# Patient Record
Sex: FEMALE | Race: WHITE | NOT HISPANIC OR LATINO | Employment: UNEMPLOYED | URBAN - METROPOLITAN AREA
[De-identification: names, ages, dates, MRNs, and addresses within clinical notes are randomized per-mention and may not be internally consistent; named-entity substitution may affect disease eponyms.]

---

## 2017-09-27 ENCOUNTER — LAB VISIT (OUTPATIENT)
Dept: LAB | Facility: HOSPITAL | Age: 62
End: 2017-09-27
Attending: DERMATOLOGY
Payer: COMMERCIAL

## 2017-09-27 ENCOUNTER — OFFICE VISIT (OUTPATIENT)
Dept: DERMATOLOGY | Facility: CLINIC | Age: 62
End: 2017-09-27
Payer: COMMERCIAL

## 2017-09-27 DIAGNOSIS — Z51.81 ENCOUNTER FOR MEDICATION MONITORING: ICD-10-CM

## 2017-09-27 DIAGNOSIS — Z85.828 PERSONAL HISTORY OF MALIGNANT NEOPLASM OF SKIN: ICD-10-CM

## 2017-09-27 DIAGNOSIS — L57.0 ACTINIC KERATOSIS: Primary | ICD-10-CM

## 2017-09-27 DIAGNOSIS — L60.3 DYSTROPHIC NAIL: ICD-10-CM

## 2017-09-27 LAB
ALBUMIN SERPL BCP-MCNC: 3.7 G/DL
ALP SERPL-CCNC: 101 U/L
ALT SERPL W/O P-5'-P-CCNC: 24 U/L
AST SERPL-CCNC: 22 U/L
BASOPHILS # BLD AUTO: 0.07 K/UL
BASOPHILS NFR BLD: 1 %
BILIRUB DIRECT SERPL-MCNC: 0.1 MG/DL
BILIRUB SERPL-MCNC: 0.4 MG/DL
DIFFERENTIAL METHOD: ABNORMAL
EOSINOPHIL # BLD AUTO: 0.3 K/UL
EOSINOPHIL NFR BLD: 4.1 %
ERYTHROCYTE [DISTWIDTH] IN BLOOD BY AUTOMATED COUNT: 13.1 %
HCT VFR BLD AUTO: 42.5 %
HGB BLD-MCNC: 14.2 G/DL
LYMPHOCYTES # BLD AUTO: 2.7 K/UL
LYMPHOCYTES NFR BLD: 38.4 %
MCH RBC QN AUTO: 30.6 PG
MCHC RBC AUTO-ENTMCNC: 33.4 G/DL
MCV RBC AUTO: 92 FL
MONOCYTES # BLD AUTO: 0.4 K/UL
MONOCYTES NFR BLD: 5.9 %
NEUTROPHILS # BLD AUTO: 3.5 K/UL
NEUTROPHILS NFR BLD: 50.3 %
PLATELET # BLD AUTO: 474 K/UL
PMV BLD AUTO: 9.6 FL
PROT SERPL-MCNC: 7.4 G/DL
RBC # BLD AUTO: 4.64 M/UL
WBC # BLD AUTO: 6.9 K/UL

## 2017-09-27 PROCEDURE — 87101 SKIN FUNGI CULTURE: CPT

## 2017-09-27 PROCEDURE — 99999 PR PBB SHADOW E&M-NEW PATIENT-LVL II: CPT | Mod: PBBFAC,,, | Performed by: DERMATOLOGY

## 2017-09-27 PROCEDURE — 85025 COMPLETE CBC W/AUTO DIFF WBC: CPT

## 2017-09-27 PROCEDURE — 99202 OFFICE O/P NEW SF 15 MIN: CPT | Mod: 25,S$GLB,, | Performed by: DERMATOLOGY

## 2017-09-27 PROCEDURE — 80076 HEPATIC FUNCTION PANEL: CPT

## 2017-09-27 PROCEDURE — 36415 COLL VENOUS BLD VENIPUNCTURE: CPT

## 2017-09-27 RX ORDER — FLUOXETINE HYDROCHLORIDE 20 MG/1
CAPSULE ORAL
Refills: 5 | COMMUNITY
Start: 2017-09-20 | End: 2018-02-21 | Stop reason: CLARIF

## 2017-09-27 NOTE — PROGRESS NOTES
Subjective:       Patient ID:  Dora Correa is a 62 y.o. female who presents for   Chief Complaint   Patient presents with    Spot     R cheek x 2 wks asymp no tx     Spot  - Initial  Affected locations: right cheek  Duration: 2 weeks  Signs / symptoms: asymptomatic  Relieving factors/Treatments tried: nothing    She is also concerned about some disfigured toenails.  She is very embarrassed by them.  No prior treatments  Derm Hx: SCC on L cheek in 1999    Past Medical History:   Diagnosis Date    Squamous cell carcinoma 1999    L cheek     Review of Systems   Skin: Positive for activity-related sunscreen use. Negative for daily sunscreen use and recent sunburn.   Hematologic/Lymphatic: Does not bruise/bleed easily.        Objective:    Physical Exam   Constitutional: She appears well-developed and well-nourished.   Neurological: She is alert and oriented to person, place, and time.   Psychiatric: She has a normal mood and affect.   Skin:   Areas Examined (abnormalities noted in diagram):   Scalp / Hair Palpated and Inspected  Head / Face Inspection Performed  Neck Inspection Performed  Chest / Axilla Inspection Performed  RLE Inspected  LLE Inspection Performed  Nails and Digits Inspection Performed                  Diagram Legend     Erythematous scaling macule/papule c/w actinic keratosis       Vascular papule c/w angioma      Pigmented verrucoid papule/plaque c/w seborrheic keratosis      Yellow umbilicated papule c/w sebaceous hyperplasia      Irregularly shaped tan macule c/w lentigo     1-2 mm smooth white papules consistent with Milia      Movable subcutaneous cyst with punctum c/w epidermal inclusion cyst      Subcutaneous movable cyst c/w pilar cyst      Firm pink to brown papule c/w dermatofibroma      Pedunculated fleshy papule(s) c/w skin tag(s)      Evenly pigmented macule c/w junctional nevus     Mildly variegated pigmented, slightly irregular-bordered macule c/w mildly atypical nevus       Flesh colored to evenly pigmented papule c/w intradermal nevus       Pink pearly papule/plaque c/w basal cell carcinoma      Erythematous hyperkeratotic cursted plaque c/w SCC      Surgical scar with no sign of skin cancer recurrence      Open and closed comedones      Inflammatory papules and pustules      Verrucoid papule consistent consistent with wart     Erythematous eczematous patches and plaques     Dystrophic onycholytic nail with subungual debris c/w onychomycosis     Umbilicated papule    Erythematous-base heme-crusted tan verrucoid plaque consistent with inflamed seborrheic keratosis     Erythematous Silvery Scaling Plaque c/w Psoriasis     See annotation      Assessment / Plan:        Actinic keratosis  Cryosurgery Procedure Note    Verbal consent from the patient is obtained and the patient is aware of the precancerous quality and need for treatment of these lesions. Liquid nitrogen cryosurgery is applied to the 1 actinic keratosis, as detailed in the physical exam, to produce a freeze injury. The patient is aware that blisters may form and is instructed on wound care with gentle cleansing and use of vaseline ointment to keep moist until healed. The patient is supplied a handout on cryosurgery and is instructed to call if lesions do not completely resolve.    Dystrophic nail  -     Fungal culture , skin, hair, or nails    CBC, LFTs today    Personal h/o NMSC  Continue annual skin checks         Return for pending culture.

## 2017-10-10 ENCOUNTER — TELEPHONE (OUTPATIENT)
Dept: DERMATOLOGY | Facility: CLINIC | Age: 62
End: 2017-10-10

## 2017-10-10 NOTE — TELEPHONE ENCOUNTER
Spoke with pt, informed her that is takes 3-4 weeks for the fungal culture to grow. Once it comes back we will give her a call. Informed her that if she likes, she can give us a call back in approx. 2 weeks to see if it is in the process of being sent to provider and we will get Dr. Alicea to check and give her the results. Pt agreed and was happy with this.     ----- Message from Neha Madison sent at 10/10/2017 11:05 AM CDT -----  Contact: pt at [phone]  Nixon pt-Pt is calling for her results from 2 weeks ago.  Can be reached at above number.

## 2017-10-19 ENCOUNTER — OFFICE VISIT (OUTPATIENT)
Dept: OPTOMETRY | Facility: CLINIC | Age: 62
End: 2017-10-19
Payer: COMMERCIAL

## 2017-10-19 DIAGNOSIS — Z98.890 S/P LASIK SURGERY OF BOTH EYES: ICD-10-CM

## 2017-10-19 DIAGNOSIS — H52.4 HYPEROPIA OF BOTH EYES WITH REGULAR ASTIGMATISM AND PRESBYOPIA: ICD-10-CM

## 2017-10-19 DIAGNOSIS — H52.03 HYPEROPIA OF BOTH EYES WITH REGULAR ASTIGMATISM AND PRESBYOPIA: ICD-10-CM

## 2017-10-19 DIAGNOSIS — H52.223 HYPEROPIA OF BOTH EYES WITH REGULAR ASTIGMATISM AND PRESBYOPIA: ICD-10-CM

## 2017-10-19 DIAGNOSIS — H25.13 NUCLEAR SCLEROSIS, BILATERAL: Primary | ICD-10-CM

## 2017-10-19 PROCEDURE — 92015 DETERMINE REFRACTIVE STATE: CPT | Mod: S$GLB,,, | Performed by: OPTOMETRIST

## 2017-10-19 PROCEDURE — 92004 COMPRE OPH EXAM NEW PT 1/>: CPT | Mod: S$GLB,,, | Performed by: OPTOMETRIST

## 2017-10-19 PROCEDURE — 99999 PR PBB SHADOW E&M-EST. PATIENT-LVL II: CPT | Mod: PBBFAC,,, | Performed by: OPTOMETRIST

## 2017-10-19 RX ORDER — LISDEXAMFETAMINE DIMESYLATE 50 MG/1
CAPSULE ORAL
COMMUNITY
Start: 2017-10-11 | End: 2018-02-21

## 2017-10-19 NOTE — LETTER
October 19, 2017      [facility]  [address] [facility] [address]           [facility]  [address]  [facility] [address]  Phone: [phone]  Fax: [phone]          Patient: Dora Correa   MR Number: [id number]   YOB: 1955   Date of Visit: 10/19/2017       Dear [facility]:    Thank you for referring Dora Correa to me for evaluation. Attached you will find relevant portions of my assessment and plan of care.    If you have questions, please do not hesitate to call me. I look forward to following Dora Correa along with you.    Sincerely,    Jassi Womack, OD    Enclosure  CC:  No Recipients    If you would like to receive this communication electronically, please contact [email or web address] or [phone] to request more information on [employer] access.    For providers and/or their staff who would like to refer a patient to [facility], please contact us through our one-stop-shop provider referral line, Rita Bui, at [phone].    If you feel you have received this communication in error or would no longer like to receive these types of communications, please e-mail [email or web address]

## 2017-10-19 NOTE — PROGRESS NOTES
HPI      is here for annual eye exam. Pt sts blurry vision when   looking at digital device, pt spends about 5-10 hrs on the computer.  +2.75 OTC Readers   (-)Flashes (-)Floaters  (-)Itch, (-)tear, (-)burn, (-)Dryness. (-) OTC Drops   (-)Photophobia  (-)Glare (-)diplopia (-) headaches          Last edited by Esthela Kumar PCT on 10/19/2017  9:30 AM. (History)            Assessment /Plan     For exam results, see Encounter Report.    Nuclear sclerosis, bilateral  -Educated patient on presence of cataracts at today's exam, monitor at annual dilated fundus exam. 8+ years surgical estimate.    S/P LASIK surgery of both eyes  -no signs of Ectasia  -hyperopic over correction  -Pt denies dry eye    Hyperopia of both eyes with regular astigmatism and presbyopia  Eyeglass Final Rx     Eyeglass Final Rx       Sphere Cylinder Axis Dist VA Add    Right +0.25 +0.50 025 20/25++ +2.25    Left +0.75 +0.75 150 20/20- +2.25    Type:  PAL    Expiration Date:  10/20/2018          Eyeglass Final Rx #2       Sphere Cylinder Axis Dist VA Add    Right +1.00 +0.50 025 20/25++ +1.50    Left +1.50 +0.75 150 20/20- +1.50    Type:  computer    Expiration Date:  10/20/2018                  RTC 1 yr

## 2017-10-31 LAB — FUNGUS BLD CULT: NORMAL

## 2017-11-06 ENCOUNTER — TELEPHONE (OUTPATIENT)
Dept: DERMATOLOGY | Facility: CLINIC | Age: 62
End: 2017-11-06

## 2017-11-06 NOTE — TELEPHONE ENCOUNTER
Sent message over to provider. Informed pt of this. Pt verbalized understanding.      ----- Message from Magali Dewey sent at 11/6/2017  9:05 AM CST -----  Contact: pt   Young- pt is calling to speak with the nurse to see if her test results are in. Pt said she is suppose to get a prescription after she gets her test results can you please call pt at [phone]    RUSH

## 2017-11-07 ENCOUNTER — TELEPHONE (OUTPATIENT)
Dept: DERMATOLOGY | Facility: CLINIC | Age: 62
End: 2017-11-07

## 2017-11-07 NOTE — TELEPHONE ENCOUNTER
Left voicemail message informing her that culture was negative and that provider wants to know if she would like to try the pills anyway.      ----- Message from Magali Dewey sent at 11/7/2017  9:05 AM CST -----  Contact: monica BARNETT-monica- pt is calling to check the status of her prescriptions and the status of her test results. Pt said she hasn't heard from Dr Alicea. Can you please call pt at [phone]    RUSH

## 2018-02-18 ENCOUNTER — NURSE TRIAGE (OUTPATIENT)
Dept: ADMINISTRATIVE | Facility: CLINIC | Age: 63
End: 2018-02-18

## 2018-02-19 NOTE — TELEPHONE ENCOUNTER
"Patient stated that she has been experiencing fatigue, weakness, exhaustion, intermittent episodes of chest pain and back pain for about 1 week. Asymptomatic at this time. Has not established with a PCP. Has a history of anxiety. Stated that she may go to urgent care tomorrow. Advised per protocol and she verbalized understanding. Instructed to call back with any additional problems and/or concerns    Reason for Disposition   Palpitations (all triage questions negative)    Answer Assessment - Initial Assessment Questions  1. DESCRIPTION: "Please describe your heart rate or heart beat that you are having" (e.g., fast/slow, regular/irregular, skipped or extra beats, "palpitations")      87  Per fitbit  2. ONSET: "When did it start?" (Minutes, hours or days)       About 1 week ago  3. DURATION: "How long does it last" (e.g., seconds, minutes, hours)      Hasn't timed it  4. PATTERN "Does it come and go, or has it been constant since it started?"  "Does it get worse with exertion?"   "Are you feeling it now?"      Intermittent. Worsens on exertion  5. TAP: "Using your hand, can you tap out what you are feeling on a chair or table in front of you, so that I can hear?" (Note: not all patients can do this)        No palpitations currently  6. HEART RATE: "Can you tell me your heart rate?" "How many beats in 15 seconds?"  (Note: not all patients can do this)        See above  7. RECURRENT SYMPTOM: "Have you ever had this before?" If so, ask: "When was the last time?" and "What happened that time?"       Not since a teenager with anxiety  8. CAUSE: "What do you think is causing the palpitations?"      unsure  9. CARDIAC HISTORY: "Do you have any history of heart disease?" (e.g., heart attack, angina, bypass surgery, angioplasty, arrhythmia)       no  10. OTHER SYMPTOMS: "Do you have any other symptoms?" (e.g., dizziness, chest pain, sweating, difficulty breathing)      Fatigue, weakness, exhaustion, intermittent chest pain, " "back pain  11. PREGNANCY: "Is there any chance you are pregnant?" "When was your last menstrual period?"      No    Protocols used: ST HEART RATE AND HEART BEAT [id number]      "

## 2018-02-21 ENCOUNTER — OFFICE VISIT (OUTPATIENT)
Dept: INTERNAL MEDICINE | Facility: CLINIC | Age: 63
End: 2018-02-21
Payer: COMMERCIAL

## 2018-02-21 VITALS
HEIGHT: 70 IN | SYSTOLIC BLOOD PRESSURE: 115 MMHG | DIASTOLIC BLOOD PRESSURE: 82 MMHG | WEIGHT: 205 LBS | HEART RATE: 107 BPM | BODY MASS INDEX: 29.35 KG/M2

## 2018-02-21 DIAGNOSIS — R53.83 FATIGUE, UNSPECIFIED TYPE: ICD-10-CM

## 2018-02-21 DIAGNOSIS — K92.2 UPPER GI BLEED: Primary | ICD-10-CM

## 2018-02-21 DIAGNOSIS — M25.561 CHRONIC PAIN OF RIGHT KNEE: ICD-10-CM

## 2018-02-21 DIAGNOSIS — G89.29 CHRONIC PAIN OF RIGHT KNEE: ICD-10-CM

## 2018-02-21 DIAGNOSIS — Z87.11 HISTORY OF GASTRIC ULCER: ICD-10-CM

## 2018-02-21 DIAGNOSIS — F32.A DEPRESSION, UNSPECIFIED DEPRESSION TYPE: ICD-10-CM

## 2018-02-21 DIAGNOSIS — R53.1 WEAKNESS: ICD-10-CM

## 2018-02-21 PROCEDURE — 3008F BODY MASS INDEX DOCD: CPT | Mod: S$GLB,,, | Performed by: INTERNAL MEDICINE

## 2018-02-21 PROCEDURE — 99203 OFFICE O/P NEW LOW 30 MIN: CPT | Mod: S$GLB,,, | Performed by: INTERNAL MEDICINE

## 2018-02-21 PROCEDURE — 99999 PR PBB SHADOW E&M-EST. PATIENT-LVL III: CPT | Mod: PBBFAC,,, | Performed by: INTERNAL MEDICINE

## 2018-02-21 RX ORDER — PANTOPRAZOLE SODIUM 40 MG/1
40 TABLET, DELAYED RELEASE ORAL 2 TIMES DAILY
Qty: 60 TABLET | Refills: 11 | Status: SHIPPED | OUTPATIENT
Start: 2018-02-21 | End: 2019-05-15

## 2018-02-21 RX ORDER — PANTOPRAZOLE SODIUM 40 MG/1
40 TABLET, DELAYED RELEASE ORAL 2 TIMES DAILY
Qty: 60 TABLET | Refills: 11 | Status: SHIPPED | OUTPATIENT
Start: 2018-02-21 | End: 2018-02-21 | Stop reason: SDUPTHER

## 2018-02-21 RX ORDER — FLUOXETINE HYDROCHLORIDE 40 MG/1
40 CAPSULE ORAL DAILY
COMMUNITY
End: 2018-05-14

## 2018-02-21 NOTE — PROGRESS NOTES
"Subjective:       Patient ID: Dora Correa is a 62 y.o. female.    Chief Complaint: No chief complaint on file.    HPI   63 yo female with PMH of dysthymic mood and gastric ulcer (2013) due to NSAID use who presents to clinic with complaint of fatigue and weakness. Patient states that she has been having generalized weakness and "black-colored" stool since Sunday. She endorses some lightheadness and occassional dizziness when getting up too quickly but denies passing out. She also believes that she is more pale than usual. Due to concern for low iron, she started taking iron supplements on Sunday.  Patient has been taking OTC ASA, advil, and aleve daily for chronic right knee pain.  She had a bleeding gastric ulcer in the past (2013) related to frequent NSAID use. She recently moved back to [place]     PMH: torn right medial meniscus, SCC of face (s/p BRENT), depression   FH: CAD (father), pancreatic cancer (mother), leukemia (mother)  Sx: kidney stone removal (at age 23)   Allergies: none  Social: former smoker (quit 1986), drinks 2 glasses of wine 3x weekly. Former psychotherapist at [employer]    Review of Systems   Constitutional: Positive for fatigue. Negative for chills, diaphoresis and fever.   HENT: Negative for trouble swallowing.    Eyes: Negative for visual disturbance.   Respiratory: Negative for chest tightness and shortness of breath.    Cardiovascular: Negative for chest pain, palpitations and leg swelling.   Gastrointestinal: Positive for abdominal pain and blood in stool. Negative for abdominal distention, constipation, diarrhea, nausea, rectal pain and vomiting.   Genitourinary: Negative for dysuria and hematuria.   Musculoskeletal: Positive for arthralgias. Negative for back pain and myalgias.   Neurological: Positive for dizziness, light-headedness and headaches. Negative for weakness.       Objective:      Physical Exam   Constitutional: She is oriented to person, place, and time. She " appears well-developed and well-nourished.   HENT:   Head: Normocephalic and atraumatic.   Right Ear: External ear normal.   Left Ear: External ear normal.   Mouth/Throat: Oropharynx is clear and moist.   Eyes: EOM are normal.   Neck: Neck supple. No JVD present.   Cardiovascular: Regular rhythm, normal heart sounds and intact distal pulses.    Tachycardic  Negative orthostatics (as performed in clinic)   Pulmonary/Chest: Effort normal and breath sounds normal.   Abdominal: Soft. Bowel sounds are normal. There is tenderness (mild epigastric tenderness).   Genitourinary: Rectal exam shows guaiac positive stool.   Genitourinary Comments: No melena on rectal exam. Stool was brown   + guaiac stool  test    Musculoskeletal: She exhibits no edema.   Neurological: She is alert and oriented to person, place, and time.   Skin: Skin is warm and dry.   Psychiatric: She has a normal mood and affect. Her behavior is normal.       Assessment:       1. Upper GI bleed    2. Depression, unspecified depression type    3. Weakness    4. Fatigue, unspecified type    5. History of gastric ulcer        Plan:       Diagnoses and all orders for this visit:    Upper GI bleed  -     CBC auto differential; Future  -     Cancel: Ambulatory Referral to Gastroenterology  -     Ambulatory Referral to Gastroenterology  -     pantoprazole (PROTONIX) 40 MG tablet; Take 1 tablet (40 mg total) by mouth 2 (two) times daily.    Depression, unspecified depression type    Weakness  -     CBC auto differential; Future  -     Ferritin; Future  -     Iron and TIBC; Future  -     Comprehensive metabolic panel; Future  -     Cancel: Ambulatory Referral to Gastroenterology  -     Ambulatory Referral to Gastroenterology    Fatigue, unspecified type  -     Comprehensive metabolic panel; Future  -     Cancel: Ambulatory Referral to Gastroenterology  -     Ambulatory Referral to Gastroenterology    History of gastric ulcer  -     pantoprazole (PROTONIX) 40 MG  tablet; Take 1 tablet (40 mg total) by mouth 2 (two) times daily.    Other orders  -     Discontinue: pantoprazole (PROTONIX) 40 MG tablet; Take 1 tablet (40 mg total) by mouth 2 (two) times daily.    This patient likely has a recurrent erosive gastropathy and/or gastric ulcer given history and frequent use of NSAIDs. JOSE was negative for melena and she had a negative orthostatic exam. I do not believe that patient needs to be urgently seen in the ED. I will place her on protonix 40 mg BID for now and refer her to GI.    However, patient was instructed that she should report to ED if symptoms get worse (more lightheaded, dizzy) or if stool becomes black and more liquid. I will call patient with blood test results from today.    RTC in 3 months. Plan discussed with Dr. Wick.    Rao Snow,   PGY2 Internal Medicine  Pager: [phone]

## 2018-02-22 ENCOUNTER — HOSPITAL ENCOUNTER (OUTPATIENT)
Facility: HOSPITAL | Age: 63
Discharge: HOME | End: 2018-02-23
Attending: EMERGENCY MEDICINE | Admitting: EMERGENCY MEDICINE
Payer: COMMERCIAL

## 2018-02-22 ENCOUNTER — TELEPHONE (OUTPATIENT)
Dept: INTERNAL MEDICINE | Facility: CLINIC | Age: 63
End: 2018-02-22

## 2018-02-22 ENCOUNTER — ANESTHESIA EVENT (OUTPATIENT)
Dept: ENDOSCOPY | Facility: HOSPITAL | Age: 63
End: 2018-02-22
Payer: COMMERCIAL

## 2018-02-22 DIAGNOSIS — K92.2 ACUTE UPPER GI BLEED: Primary | ICD-10-CM

## 2018-02-22 DIAGNOSIS — K92.2 GI BLEED DUE TO NSAIDS: ICD-10-CM

## 2018-02-22 DIAGNOSIS — G89.29 CHRONIC PAIN OF RIGHT KNEE: ICD-10-CM

## 2018-02-22 DIAGNOSIS — M25.561 CHRONIC PAIN OF RIGHT KNEE: ICD-10-CM

## 2018-02-22 DIAGNOSIS — K92.1 MELENA: ICD-10-CM

## 2018-02-22 DIAGNOSIS — T39.395A GI BLEED DUE TO NSAIDS: ICD-10-CM

## 2018-02-22 DIAGNOSIS — R10.13 EPIGASTRIC ABDOMINAL PAIN: ICD-10-CM

## 2018-02-22 DIAGNOSIS — Z87.11 HISTORY OF GASTRIC ULCER: ICD-10-CM

## 2018-02-22 LAB
ABO + RH BLD: NORMAL
ALBUMIN SERPL BCP-MCNC: 3.4 G/DL
ALP SERPL-CCNC: 73 U/L
ALT SERPL W/O P-5'-P-CCNC: 16 U/L
ANION GAP SERPL CALC-SCNC: 11 MMOL/L
AST SERPL-CCNC: 24 U/L
B-OH-BUTYR BLD STRIP-SCNC: 0.2 MMOL/L
BASOPHILS # BLD AUTO: 0.07 K/UL
BASOPHILS NFR BLD: 0.9 %
BILIRUB SERPL-MCNC: 0.2 MG/DL
BLD GP AB SCN CELLS X3 SERPL QL: NORMAL
BUN SERPL-MCNC: 11 MG/DL
CALCIUM SERPL-MCNC: 8.7 MG/DL
CHLORIDE SERPL-SCNC: 109 MMOL/L
CO2 SERPL-SCNC: 19 MMOL/L
CREAT SERPL-MCNC: 0.7 MG/DL
DIFFERENTIAL METHOD: ABNORMAL
EOSINOPHIL # BLD AUTO: 0.2 K/UL
EOSINOPHIL NFR BLD: 2.4 %
ERYTHROCYTE [DISTWIDTH] IN BLOOD BY AUTOMATED COUNT: 13.3 %
EST. GFR  (AFRICAN AMERICAN): >60 ML/MIN/1.73 M^2
EST. GFR  (NON AFRICAN AMERICAN): >60 ML/MIN/1.73 M^2
GLUCOSE SERPL-MCNC: 91 MG/DL
HCT VFR BLD AUTO: 26.2 %
HGB BLD-MCNC: 8.7 G/DL
IMM GRANULOCYTES # BLD AUTO: 0.03 K/UL
IMM GRANULOCYTES NFR BLD AUTO: 0.4 %
INR PPP: 1
LIPASE SERPL-CCNC: 21 U/L
LYMPHOCYTES # BLD AUTO: 2.9 K/UL
LYMPHOCYTES NFR BLD: 34.9 %
MAGNESIUM SERPL-MCNC: 2.2 MG/DL
MCH RBC QN AUTO: 31.6 PG
MCHC RBC AUTO-ENTMCNC: 33.2 G/DL
MCV RBC AUTO: 95 FL
MONOCYTES # BLD AUTO: 0.6 K/UL
MONOCYTES NFR BLD: 7.1 %
NEUTROPHILS # BLD AUTO: 4.4 K/UL
NEUTROPHILS NFR BLD: 54.3 %
NRBC BLD-RTO: 0 /100 WBC
PHOSPHATE SERPL-MCNC: 2.7 MG/DL
PLATELET # BLD AUTO: 464 K/UL
PMV BLD AUTO: 9.6 FL
POTASSIUM SERPL-SCNC: 4.1 MMOL/L
PROT SERPL-MCNC: 6.9 G/DL
PROTHROMBIN TIME: 10.6 SEC
RBC # BLD AUTO: 2.75 M/UL
SODIUM SERPL-SCNC: 139 MMOL/L
WBC # BLD AUTO: 8.17 K/UL

## 2018-02-22 PROCEDURE — G0378 HOSPITAL OBSERVATION PER HR: HCPCS

## 2018-02-22 PROCEDURE — 84100 ASSAY OF PHOSPHORUS: CPT

## 2018-02-22 PROCEDURE — 93010 ELECTROCARDIOGRAM REPORT: CPT | Mod: ,,, | Performed by: INTERNAL MEDICINE

## 2018-02-22 PROCEDURE — 83690 ASSAY OF LIPASE: CPT

## 2018-02-22 PROCEDURE — 82010 KETONE BODYS QUAN: CPT

## 2018-02-22 PROCEDURE — 63600175 PHARM REV CODE 636 W HCPCS: Performed by: EMERGENCY MEDICINE

## 2018-02-22 PROCEDURE — 85610 PROTHROMBIN TIME: CPT

## 2018-02-22 PROCEDURE — 25000003 PHARM REV CODE 250: Performed by: PHYSICIAN ASSISTANT

## 2018-02-22 PROCEDURE — 86850 RBC ANTIBODY SCREEN: CPT

## 2018-02-22 PROCEDURE — 86920 COMPATIBILITY TEST SPIN: CPT

## 2018-02-22 PROCEDURE — C9113 INJ PANTOPRAZOLE SODIUM, VIA: HCPCS | Performed by: EMERGENCY MEDICINE

## 2018-02-22 PROCEDURE — 93005 ELECTROCARDIOGRAM TRACING: CPT

## 2018-02-22 PROCEDURE — 36415 COLL VENOUS BLD VENIPUNCTURE: CPT

## 2018-02-22 PROCEDURE — 99220 PR INITIAL OBSERVATION CARE,LEVL III: CPT | Mod: ,,, | Performed by: PHYSICIAN ASSISTANT

## 2018-02-22 PROCEDURE — 96374 THER/PROPH/DIAG INJ IV PUSH: CPT

## 2018-02-22 PROCEDURE — 85025 COMPLETE CBC W/AUTO DIFF WBC: CPT

## 2018-02-22 PROCEDURE — 80053 COMPREHEN METABOLIC PANEL: CPT

## 2018-02-22 PROCEDURE — 99284 EMERGENCY DEPT VISIT MOD MDM: CPT | Mod: 25

## 2018-02-22 PROCEDURE — 83735 ASSAY OF MAGNESIUM: CPT

## 2018-02-22 RX ORDER — PANTOPRAZOLE SODIUM 40 MG/1
40 TABLET, DELAYED RELEASE ORAL 2 TIMES DAILY
Status: DISCONTINUED | OUTPATIENT
Start: 2018-02-22 | End: 2018-02-22

## 2018-02-22 RX ORDER — HYDROCODONE BITARTRATE AND ACETAMINOPHEN 500; 5 MG/1; MG/1
TABLET ORAL
Status: DISCONTINUED | OUTPATIENT
Start: 2018-02-22 | End: 2018-02-23

## 2018-02-22 RX ORDER — ACETAMINOPHEN 325 MG/1
650 TABLET ORAL EVERY 4 HOURS PRN
Status: DISCONTINUED | OUTPATIENT
Start: 2018-02-22 | End: 2018-02-23 | Stop reason: HOSPADM

## 2018-02-22 RX ORDER — PANTOPRAZOLE SODIUM 40 MG/10ML
80 INJECTION, POWDER, LYOPHILIZED, FOR SOLUTION INTRAVENOUS
Status: DISCONTINUED | OUTPATIENT
Start: 2018-02-22 | End: 2018-02-22

## 2018-02-22 RX ORDER — ONDANSETRON 2 MG/ML
4 INJECTION INTRAMUSCULAR; INTRAVENOUS EVERY 8 HOURS PRN
Status: DISCONTINUED | OUTPATIENT
Start: 2018-02-22 | End: 2018-02-23 | Stop reason: HOSPADM

## 2018-02-22 RX ORDER — FLUOXETINE HYDROCHLORIDE 20 MG/1
40 CAPSULE ORAL DAILY
Status: DISCONTINUED | OUTPATIENT
Start: 2018-02-23 | End: 2018-02-23 | Stop reason: HOSPADM

## 2018-02-22 RX ORDER — PANTOPRAZOLE SODIUM 40 MG/10ML
40 INJECTION, POWDER, LYOPHILIZED, FOR SOLUTION INTRAVENOUS 2 TIMES DAILY
Status: DISCONTINUED | OUTPATIENT
Start: 2018-02-22 | End: 2018-02-23

## 2018-02-22 RX ORDER — ONDANSETRON 8 MG/1
8 TABLET, ORALLY DISINTEGRATING ORAL EVERY 8 HOURS PRN
Status: DISCONTINUED | OUTPATIENT
Start: 2018-02-22 | End: 2018-02-23 | Stop reason: HOSPADM

## 2018-02-22 RX ORDER — PANTOPRAZOLE SODIUM 40 MG/10ML
40 INJECTION, POWDER, LYOPHILIZED, FOR SOLUTION INTRAVENOUS
Status: COMPLETED | OUTPATIENT
Start: 2018-02-22 | End: 2018-02-22

## 2018-02-22 RX ORDER — SODIUM CHLORIDE 0.9 % (FLUSH) 0.9 %
3 SYRINGE (ML) INJECTION
Status: DISCONTINUED | OUTPATIENT
Start: 2018-02-22 | End: 2018-02-23 | Stop reason: HOSPADM

## 2018-02-22 RX ORDER — SODIUM CHLORIDE 9 MG/ML
INJECTION, SOLUTION INTRAVENOUS CONTINUOUS
Status: DISCONTINUED | OUTPATIENT
Start: 2018-02-22 | End: 2018-02-23 | Stop reason: HOSPADM

## 2018-02-22 RX ADMIN — SODIUM CHLORIDE: 0.9 INJECTION, SOLUTION INTRAVENOUS at 06:02

## 2018-02-22 RX ADMIN — PANTOPRAZOLE SODIUM 40 MG: 40 INJECTION, POWDER, FOR SOLUTION INTRAVENOUS at 03:02

## 2018-02-22 NOTE — ED NOTES
Reports dark stools since Sunday.  Had labs drawn yesterday and was told to come to ED today for Hgb of 8.7.  Reports some SOB and generalized weakness.  Hx bleeding ulcer.

## 2018-02-22 NOTE — PROGRESS NOTES
Preceptor note    Patient's history and physical discussed, please refer to resident physician's note for specific details. Pt seen with resident physician. Medical record reviewed. Guaiac positive formed stool. Hemodynamically stable in clinic. I agree with resident's assessment and plan. After visit, Labs returned with Hgb 8.7 down from 14. GI on-call team notified and patient advised to go to ER for further evaluation

## 2018-02-22 NOTE — H&P
History and Physical  Hospital Medicine       Team: Networked reference to record PCT  Jared LACEY RADHA Morley  Admit Date: 2/22/2018  OLIVERIO 2/24/18  Principal Problem:  Acute upper GI bleed   Patient information was obtained from patient, past medical records and ER records.   Primary care Physician: Primary Doctor Rachel    HPI: Patient is a 61yo F with PMHx of depression, chronic knee pain, and gastric ulcers being admitted to observation for evaluation of acute upper GI bleed. Patient was sent to the ED from PCP for H&H of 8.7. Patient states she noticed that she had dark stools that started around 5 days ago and have been progressive since that time. Denies any vaginal bleeding/hematuria. No nausea/vomiting. No known bleeding disorders. Patient endorses associated fatigue so intense she could not get out of bed. She had a similar complaint 5 years ago living in [place] at which point she had a confirmed gastric ulcer.  At that time they did EGD which found that the ulcer. Patient states she was taking NSAIDs daily until that time. Over the last several months, she reports taking Aleve daily with additional Advil.    In the ED, vitals stable. Intake labs remarkable for Hgb 8.7, stable from yesterday's lab. Platelets 464. GI consulted by the ED, likely EGD in the morning.    Review of Systems   Constitutional: Positive for diaphoresis. Negative for chills and fever.   HENT: Negative for sore throat.    Eyes: Negative for blurred vision, double vision and photophobia.   Respiratory: Positive for shortness of breath. Negative for cough, hemoptysis and wheezing.    Cardiovascular: Positive for palpitations. Negative for chest pain, leg swelling and PND.   Gastrointestinal: Positive for abdominal pain, blood in stool, constipation, heartburn and nausea. Negative for melena and vomiting.   Genitourinary: Negative for dysuria, flank pain, frequency and urgency.   Musculoskeletal: Positive for joint pain (chronic, bilateral  knees).   Neurological: Positive for dizziness, weakness and headaches. Negative for sensory change, focal weakness and loss of consciousness.   Psychiatric/Behavioral: Positive for depression. The patient is not nervous/anxious and does not have insomnia.        Past Medical History: Patient has a past medical history of Squamous cell carcinoma (1999).    Past Surgical History: Patient has no past surgical history on file.    Social History: Patient reports that she has never smoked. She has never used smokeless tobacco. She reports that she drinks alcohol. She reports that she does not use drugs.    Family History: family history is not on file.    Medications: reviewed     Allergies: Patient has No Known Allergies.    Physical Exam:     Vital Signs (Most Recent):  Temp: 98.2 °F (36.8 °C) (02/22/18 1304)  Pulse: 75 (02/22/18 1558)  Resp: 20 (02/22/18 1558)  BP: 128/76 (02/22/18 1531)  SpO2: 97 % (02/22/18 1558) Vital Signs Range (Last 24H):  Temp:  [98.2 °F (36.8 °C)]   Pulse:  []   Resp:  [17-20]   BP: (114-128)/(67-82)   SpO2:  [97 %-100 %]    Body mass index is 30.41 kg/m².     Physical Exam   Constitutional: She is oriented to person, place, and time and well-developed, well-nourished, and in no distress. No distress.   HENT:   Head: Normocephalic and atraumatic.   Mouth/Throat: No oropharyngeal exudate.   Eyes: EOM are normal. Pupils are equal, round, and reactive to light. No scleral icterus.   Neck: Normal range of motion. Neck supple. No thyromegaly present.   Cardiovascular: Normal rate, regular rhythm, normal heart sounds and intact distal pulses.    No murmur heard.  Pulmonary/Chest: Effort normal and breath sounds normal. No respiratory distress. She has no wheezes.   Abdominal: Soft. Bowel sounds are normal. There is tenderness in the epigastric area. There is negative Azul's sign.   Musculoskeletal: Normal range of motion. She exhibits no edema or tenderness.   Lymphadenopathy:     She has no  cervical adenopathy.   Neurological: She is alert and oriented to person, place, and time. No cranial nerve deficit.   Skin: Skin is warm and dry. She is not diaphoretic. No erythema.   Psychiatric: Mood, memory, affect and judgment normal.   Nursing note and vitals reviewed.        Recent Labs  Lab 02/21/18  1710 02/22/18  1533   WBC 9.61 8.17   HGB 8.7* 8.7*   HCT 26.1* 26.2*   * 464*         Recent Labs  Lab 02/21/18 1710 02/22/18  1533    139   K 4.5 4.1    109   CO2 23 19*   BUN 12 11   CREATININE 0.7 0.7    91   CALCIUM 9.7 8.7   LIPASE  --  21       Recent Labs  Lab 02/21/18 1710 02/22/18  1533   ALKPHOS 74 73   ALT 16 16   AST 16 24   ALBUMIN 3.6 3.4*   PROT 6.9 6.9   BILITOT 0.3 0.2   INR  --  1.0      No results for input(s): POCTGLUCOSE in the last 168 hours.    Other diagnostic tests None    Assessment and Plan     Active Hospital Problems    Diagnosis  POA    *Acute upper GI bleed [K92.2]  Unknown    Depression [F32.9]  Yes    History of gastric ulcer [Z87.19]  Not Applicable    Chronic pain of right knee [M25.561, G89.29]  Yes      Resolved Hospital Problems    Diagnosis Date Resolved POA   No resolved problems to display.     Acute upper GI bleed  History of gastric ulcer  Chronic pain of right knee  Patient with daily NSAID use and history of gastric ulcer 5 years ago due to daily NSAID use admitted for Hgb 8.7 with dark stools since Sunday. H&H stable from previous day. Heme occult positive in the ED.   - GI consulted, plan for EGD tomorrow morning  - NPO except for ice chips  - continuous IV fluids  - Protonix 40 IV BID  - type & screen drawn, consented and placed in chart  - daily CBC     Depression  - continue Fluoxetine 40mg daily    DVT prophylaxis: SCDs/TEDs, active GI bleed    Discharge Plan: Pending resolution of GI bleed, 1-3 days    HIGH RISK CONDITION(S):  Patient has a condition that poses threat to life and bodily function: Active GI bleed     Jared LACEY  RADHA Morley

## 2018-02-22 NOTE — ED PROVIDER NOTES
Encounter Date: 2/22/2018    SCRIBE #1 NOTE: I, Jessica Smith, am scribing for, and in the presence of,  Dr. Hagen. I have scribed the following portions of the note - the Resident attestation.       History     Chief Complaint   Patient presents with    Abnormal Lab     Pt sent to ED for low H & H.  Labs were drawn yesterday.     HPI   Patient is a 62-year-old female with past medical history of gastric ulcers presents with decreased H&H.  She was seen by her primary care physician who saw her yesterday and noticed that she had dark stools.  At that time he decided that it was not melena and decided to send her home with follow-up.  Patient received her labs from yesterday morning consulted come to the emergency room for acute drop in H&H. Pt states that the dark stools started around 5 days ago and have been progressive since that time. Denies any vaginal bleeding/hematuria. No nausea/vomiting. No known bleeding disorders.     States that she has been having dark stools for the past 5 days associated with decreased energy and fatigue. Pt states that she was barely able to get out of bed the other day 2/2 to fatigue.  Since that she had similar complaint on 5 years ago living in [place] which point she had a confirmed gastric ulcer.  At that time they did EGD which found that the ulcer and then restart bleeding.  Has not had any problems with the ulcer since that time.  States that at that time was taking a lot of NSAIDs 2/2 to arthritis. States that she has been taking increasing amounts of NSAIDS recently.   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 No pertinent surgical history.  Family History   Problem Relation Age of Onset    Melanoma Neg Hx      Social History   Substance Use Topics    Smoking status: Never Smoker    Smokeless tobacco: Never Used    Alcohol use Yes     Review of  Systems   Constitutional: Positive for activity change and fatigue. Negative for fever.   HENT: Negative for sore throat.    Respiratory: Negative for shortness of breath.    Cardiovascular: Negative for chest pain.   Gastrointestinal: Positive for abdominal pain. Negative for nausea.   Genitourinary: Negative for dysuria.   Musculoskeletal: Negative for back pain.   Skin: Negative for rash.   Neurological: Negative for weakness.   Hematological: Does not bruise/bleed easily.       Physical Exam     Initial Vitals [02/22/18 1304]   BP Pulse Resp Temp SpO2   123/67 100 20 98.2 °F (36.8 °C) 99 %      MAP       85.67         Physical Exam    Nursing note and vitals reviewed.  Constitutional: She appears well-developed and well-nourished. She is not diaphoretic. No distress.   Pale mucous membranes.    HENT:   Head: Normocephalic and atraumatic.   Right Ear: External ear normal.   Left Ear: External ear normal.   Nose: Nose normal.   Mouth/Throat: Oropharynx is clear and moist. No oropharyngeal exudate.   Eyes: Conjunctivae and EOM are normal. Pupils are equal, round, and reactive to light. Right eye exhibits no discharge. No scleral icterus.   Neck: Normal range of motion. Neck supple. No tracheal deviation present.   Cardiovascular: Normal rate, regular rhythm, normal heart sounds and intact distal pulses. Exam reveals no gallop and no friction rub.    No murmur heard.  Pulmonary/Chest: Breath sounds normal. No respiratory distress. She has no wheezes. She has no rhonchi. She has no rales.   Abdominal: Soft. Bowel sounds are normal. She exhibits no distension. There is tenderness. There is no rebound and no guarding.   Mild epigastric tenderness.    Genitourinary:   Genitourinary Comments: Gross melena on rectal exam. Good tone. Bedside FOBT positive.    Musculoskeletal: Normal range of motion. She exhibits no edema or tenderness.   Neurological: She is alert and oriented to person, place, and time.   Skin: Skin is  warm and dry. Capillary refill takes less than 2 seconds. No erythema.   Psychiatric: She has a normal mood and affect. Thought content normal.         ED Course   Procedures  Labs Reviewed - No data to display  EKG Readings: (Independently Interpreted)   Initial Reading: No STEMI. Rhythm: Normal Sinus Rhythm. Heart Rate: 75. Ectopy: No Ectopy. Conduction: Normal. ST Segments: Normal ST Segments. T Waves: Normal. Axis: Normal.   Normal EKG. No previous to compare.           Medical Decision Making:   History:   Old Medical Records: I decided to obtain old medical records.  Initial Assessment:   PGY-1 [facility]    Pt is a 62 y.o. female presenting with drop in H/H   Vitals stable. Normotensive and not tachycardic.   Exam significant for pale mucous membranes, gross melena on rectal exam.   DDx includes but not limited to GI bleed, vaginal bleed, anemia, metabolic,     Symptoms on the gross melena on rectal exam for FOBT positive.  Results of GI who states will most likely scope her in the morning and given staple nature as of right now.  Consulted medicine for admission and so GI consult per.  Patient took 40 of protonix this morning so give her another 40 now to load her.  Patient's H&H found to be 8/26.  Patient currently asymptomatic except for mild fatigue.  Currently awaiting on remainder of labs.     Oneil Wayne M.D.  [facility], PGY-1  02/22/2018 4:15 PM    Clinical Tests:   Lab Tests: Ordered and Reviewed  Medical Tests: Ordered and Reviewed  ED Management:  I, Dr. Tamera Hagen, personally performed the services described in this documentation. All medical record entries made by the scribe were at my direction and in my presence. pls refer to my addendum/mdm for corrections;   She is stable per vss and sx will be scoped tmrw            Scribe Attestation:   Scribe #1: I performed the above scribed service and the documentation accurately describes the services I performed. I attest to the  accuracy of the note.    Attending Attestation:   Physician Attestation Statement for Resident:  As the supervising MD   Physician Attestation Statement: I have personally seen and examined this patient.   I agree with the above history. -: 62 y.o. female who presents with acute drop in Hg, melena on exam. Vitals stable. Will admit this pt. She already took Protonix this AM. Will not be on Protonix drip as per pharmacy. We repleted her Potassium.   As the supervising MD I agree with the above PE.    As the supervising MD I agree with the above treatment, course, plan, and disposition.                       Clinical Impression:   There were no encounter diagnoses.                           Tamera Hagen MD  02/22/18 9183

## 2018-02-23 ENCOUNTER — TELEPHONE (OUTPATIENT)
Dept: INTERNAL MEDICINE | Facility: CLINIC | Age: 63
End: 2018-02-23

## 2018-02-23 ENCOUNTER — ANESTHESIA (OUTPATIENT)
Dept: ENDOSCOPY | Facility: HOSPITAL | Age: 63
End: 2018-02-23
Payer: COMMERCIAL

## 2018-02-23 ENCOUNTER — SURGERY (OUTPATIENT)
Age: 63
End: 2018-02-23

## 2018-02-23 VITALS
TEMPERATURE: 97 F | RESPIRATION RATE: 18 BRPM | DIASTOLIC BLOOD PRESSURE: 57 MMHG | SYSTOLIC BLOOD PRESSURE: 126 MMHG | BODY MASS INDEX: 31.39 KG/M2 | WEIGHT: 200 LBS | HEIGHT: 67 IN | OXYGEN SATURATION: 100 % | HEART RATE: 77 BPM

## 2018-02-23 PROBLEM — K92.2 ACUTE UPPER GI BLEED: Status: ACTIVE | Noted: 2018-02-23

## 2018-02-23 LAB
ANION GAP SERPL CALC-SCNC: 4 MMOL/L
BACTERIA #/AREA URNS AUTO: ABNORMAL /HPF
BASOPHILS # BLD AUTO: 0.04 K/UL
BASOPHILS NFR BLD: 0.8 %
BILIRUB UR QL STRIP: NEGATIVE
BLD PROD TYP BPU: NORMAL
BLOOD UNIT EXPIRATION DATE: NORMAL
BLOOD UNIT TYPE CODE: 5100
BLOOD UNIT TYPE: NORMAL
BUN SERPL-MCNC: 12 MG/DL
CALCIUM SERPL-MCNC: 8.1 MG/DL
CHLORIDE SERPL-SCNC: 112 MMOL/L
CLARITY UR REFRACT.AUTO: ABNORMAL
CO2 SERPL-SCNC: 26 MMOL/L
CODING SYSTEM: NORMAL
COLOR UR AUTO: YELLOW
CREAT SERPL-MCNC: 0.7 MG/DL
DIFFERENTIAL METHOD: ABNORMAL
DISPENSE STATUS: NORMAL
EOSINOPHIL # BLD AUTO: 0.2 K/UL
EOSINOPHIL NFR BLD: 3.5 %
ERYTHROCYTE [DISTWIDTH] IN BLOOD BY AUTOMATED COUNT: 13.2 %
EST. GFR  (AFRICAN AMERICAN): >60 ML/MIN/1.73 M^2
EST. GFR  (NON AFRICAN AMERICAN): >60 ML/MIN/1.73 M^2
GLUCOSE SERPL-MCNC: 101 MG/DL
GLUCOSE UR QL STRIP: NEGATIVE
HCT VFR BLD AUTO: 21.3 %
HGB BLD-MCNC: 7 G/DL
HGB UR QL STRIP: NEGATIVE
IMM GRANULOCYTES # BLD AUTO: 0.01 K/UL
IMM GRANULOCYTES NFR BLD AUTO: 0.2 %
KETONES UR QL STRIP: NEGATIVE
LEUKOCYTE ESTERASE UR QL STRIP: ABNORMAL
LYMPHOCYTES # BLD AUTO: 2.1 K/UL
LYMPHOCYTES NFR BLD: 41.1 %
MCH RBC QN AUTO: 31.4 PG
MCHC RBC AUTO-ENTMCNC: 32.9 G/DL
MCV RBC AUTO: 96 FL
MICROSCOPIC COMMENT: ABNORMAL
MONOCYTES # BLD AUTO: 0.4 K/UL
MONOCYTES NFR BLD: 8.3 %
NEUTROPHILS # BLD AUTO: 2.4 K/UL
NEUTROPHILS NFR BLD: 46.1 %
NITRITE UR QL STRIP: NEGATIVE
NRBC BLD-RTO: 0 /100 WBC
PH UR STRIP: 5 [PH] (ref 5–8)
PLATELET # BLD AUTO: 356 K/UL
PMV BLD AUTO: 9.5 FL
POTASSIUM SERPL-SCNC: 3.7 MMOL/L
PROT UR QL STRIP: NEGATIVE
RBC # BLD AUTO: 2.23 M/UL
RBC #/AREA URNS AUTO: 1 /HPF (ref 0–4)
SODIUM SERPL-SCNC: 142 MMOL/L
SP GR UR STRIP: 1.02 (ref 1–1.03)
SQUAMOUS #/AREA URNS AUTO: 1 /HPF
TRANS ERYTHROCYTES VOL PATIENT: NORMAL ML
URN SPEC COLLECT METH UR: ABNORMAL
UROBILINOGEN UR STRIP-ACNC: NEGATIVE EU/DL
WBC # BLD AUTO: 5.21 K/UL
WBC #/AREA URNS AUTO: 6 /HPF (ref 0–5)

## 2018-02-23 PROCEDURE — 99244 OFF/OP CNSLTJ NEW/EST MOD 40: CPT | Mod: 25,,, | Performed by: INTERNAL MEDICINE

## 2018-02-23 PROCEDURE — 43239 EGD BIOPSY SINGLE/MULTIPLE: CPT | Performed by: INTERNAL MEDICINE

## 2018-02-23 PROCEDURE — 94761 N-INVAS EAR/PLS OXIMETRY MLT: CPT

## 2018-02-23 PROCEDURE — D9220A PRA ANESTHESIA: Mod: CRNA,,, | Performed by: NURSE ANESTHETIST, CERTIFIED REGISTERED

## 2018-02-23 PROCEDURE — 43239 EGD BIOPSY SINGLE/MULTIPLE: CPT | Mod: ,,, | Performed by: INTERNAL MEDICINE

## 2018-02-23 PROCEDURE — G0378 HOSPITAL OBSERVATION PER HR: HCPCS

## 2018-02-23 PROCEDURE — P9021 RED BLOOD CELLS UNIT: HCPCS

## 2018-02-23 PROCEDURE — 36415 COLL VENOUS BLD VENIPUNCTURE: CPT

## 2018-02-23 PROCEDURE — 37000009 HC ANESTHESIA EA ADD 15 MINS: Performed by: INTERNAL MEDICINE

## 2018-02-23 PROCEDURE — 80048 BASIC METABOLIC PNL TOTAL CA: CPT

## 2018-02-23 PROCEDURE — 88305 TISSUE EXAM BY PATHOLOGIST: CPT | Mod: 26,,, | Performed by: PATHOLOGY

## 2018-02-23 PROCEDURE — 27201012 HC FORCEPS, HOT/COLD, DISP: Performed by: INTERNAL MEDICINE

## 2018-02-23 PROCEDURE — 88305 TISSUE EXAM BY PATHOLOGIST: CPT | Performed by: PATHOLOGY

## 2018-02-23 PROCEDURE — 85025 COMPLETE CBC W/AUTO DIFF WBC: CPT

## 2018-02-23 PROCEDURE — D9220A PRA ANESTHESIA: Mod: ANES,,, | Performed by: ANESTHESIOLOGY

## 2018-02-23 PROCEDURE — C9113 INJ PANTOPRAZOLE SODIUM, VIA: HCPCS | Performed by: PHYSICIAN ASSISTANT

## 2018-02-23 PROCEDURE — 81001 URINALYSIS AUTO W/SCOPE: CPT

## 2018-02-23 PROCEDURE — 63600175 PHARM REV CODE 636 W HCPCS: Performed by: NURSE ANESTHETIST, CERTIFIED REGISTERED

## 2018-02-23 PROCEDURE — 37000008 HC ANESTHESIA 1ST 15 MINUTES: Performed by: INTERNAL MEDICINE

## 2018-02-23 PROCEDURE — 99217 PR OBSERVATION CARE DISCHARGE: CPT | Mod: ,,, | Performed by: PHYSICIAN ASSISTANT

## 2018-02-23 PROCEDURE — 63600175 PHARM REV CODE 636 W HCPCS: Performed by: PHYSICIAN ASSISTANT

## 2018-02-23 PROCEDURE — 25000003 PHARM REV CODE 250: Performed by: STUDENT IN AN ORGANIZED HEALTH CARE EDUCATION/TRAINING PROGRAM

## 2018-02-23 PROCEDURE — 25000003 PHARM REV CODE 250: Performed by: PHYSICIAN ASSISTANT

## 2018-02-23 RX ORDER — FERROUS SULFATE 325(65) MG
325 TABLET, DELAYED RELEASE (ENTERIC COATED) ORAL DAILY
Qty: 30 TABLET | Refills: 11 | Status: SHIPPED | OUTPATIENT
Start: 2018-02-24 | End: 2019-08-22

## 2018-02-23 RX ORDER — SUCRALFATE 1 G/10ML
1 SUSPENSION ORAL 2 TIMES DAILY
Status: DISCONTINUED | OUTPATIENT
Start: 2018-02-23 | End: 2018-02-23 | Stop reason: HOSPADM

## 2018-02-23 RX ORDER — FERROUS SULFATE 325(65) MG
325 TABLET, DELAYED RELEASE (ENTERIC COATED) ORAL DAILY
Status: DISCONTINUED | OUTPATIENT
Start: 2018-02-23 | End: 2018-02-23 | Stop reason: HOSPADM

## 2018-02-23 RX ORDER — PROPOFOL 10 MG/ML
VIAL (ML) INTRAVENOUS
Status: DISCONTINUED | OUTPATIENT
Start: 2018-02-23 | End: 2018-02-23

## 2018-02-23 RX ORDER — PANTOPRAZOLE SODIUM 40 MG/1
40 TABLET, DELAYED RELEASE ORAL DAILY
Status: DISCONTINUED | OUTPATIENT
Start: 2018-02-24 | End: 2018-02-23 | Stop reason: HOSPADM

## 2018-02-23 RX ORDER — HYDROCODONE BITARTRATE AND ACETAMINOPHEN 500; 5 MG/1; MG/1
TABLET ORAL
Status: DISCONTINUED | OUTPATIENT
Start: 2018-02-23 | End: 2018-02-23 | Stop reason: HOSPADM

## 2018-02-23 RX ORDER — SUCRALFATE 1 G/10ML
1 SUSPENSION ORAL 2 TIMES DAILY
Qty: 1 BOTTLE | Refills: 2 | Status: SHIPPED | OUTPATIENT
Start: 2018-02-23 | End: 2018-03-09

## 2018-02-23 RX ORDER — LIDOCAINE HCL/PF 100 MG/5ML
SYRINGE (ML) INTRAVENOUS
Status: DISCONTINUED | OUTPATIENT
Start: 2018-02-23 | End: 2018-02-23

## 2018-02-23 RX ADMIN — PROPOFOL 100 MG: 10 INJECTION, EMULSION INTRAVENOUS at 09:02

## 2018-02-23 RX ADMIN — LIDOCAINE HYDROCHLORIDE 100 MG: 20 INJECTION, SOLUTION INTRAVENOUS at 09:02

## 2018-02-23 RX ADMIN — PROPOFOL 50 MG: 10 INJECTION, EMULSION INTRAVENOUS at 09:02

## 2018-02-23 RX ADMIN — PANTOPRAZOLE SODIUM 40 MG: 40 INJECTION, POWDER, FOR SOLUTION INTRAVENOUS at 04:02

## 2018-02-23 RX ADMIN — PROPOFOL 30 MG: 10 INJECTION, EMULSION INTRAVENOUS at 10:02

## 2018-02-23 RX ADMIN — FLUOXETINE 40 MG: 20 CAPSULE ORAL at 11:02

## 2018-02-23 RX ADMIN — PROPOFOL 50 MG: 10 INJECTION, EMULSION INTRAVENOUS at 10:02

## 2018-02-23 RX ADMIN — FERROUS SULFATE TAB EC 325 MG (65 MG FE EQUIVALENT) 325 MG: 325 (65 FE) TABLET DELAYED RESPONSE at 11:02

## 2018-02-23 NOTE — ANESTHESIA PREPROCEDURE EVALUATION
Pre-operative evaluation for Procedure(s) (LRB):  ESOPHAGOGASTRODUODENOSCOPY (EGD) (N/A)    Dora Correa is a 62 y.o. female w/ PMHx of depression, chronic knee pain, and gastric ulcers being admitted to observation for evaluation of acute upper GI bleed. Pt now presents for above procedure. Patient was sent to the ED from PCP for H&H of 8.7. Patient states she noticed that she had dark stools that started around 5 days ago and have been progressive since that time.     In the ED, vitals stable. Intake labs remarkable for Hgb 8.7, stable from yesterday's lab. Platelets 464.     LDA:   L hand 18 g PIV     Prev airway: none on record       Patient Active Problem List   Diagnosis    Depression    History of gastric ulcer    Chronic pain of right knee    Acute upper GI bleed       Review of patient's allergies indicates:  No Known Allergies     No current facility-administered medications on file prior to encounter.      Current Outpatient Prescriptions on File Prior to Encounter   Medication Sig Dispense Refill    FLUoxetine (PROZAC) 40 MG capsule Take 40 mg by mouth once daily.      pantoprazole (PROTONIX) 40 MG tablet Take 1 tablet (40 mg total) by mouth 2 (two) times daily. 60 tablet 11       History reviewed. No pertinent surgical history.    Social History     Social History    Marital status: Unknown     Spouse name: N/A    Number of children: N/A    Years of education: N/A     Occupational History    Not on file.     Social History Main Topics    Smoking status: Never Smoker    Smokeless tobacco: Never Used    Alcohol use Yes    Drug use: No    Sexual activity: Not on file     Other Topics Concern    Not on file     Social History Narrative    No narrative on file         Vital Signs Range (Last 24H):  Temp:  [36.8 °C (98.2 °F)]   Pulse:  []   Resp:  [17-20]   BP: (114-128)/(67-76)   SpO2:  [97 %-100 %]       CBC:    Recent Labs      02/21/18   1710  02/22/18   1533   WBC  9.61  8.17   RBC  2.80*  2.75*   HGB  8.7*  8.7*   HCT  26.1*  26.2*   PLT  534*  464*   MCV  93  95   MCH  31.1*  31.6*   MCHC  33.3  33.2       CMP:   Recent Labs      02/21/18   1710  02/22/18   1533   NA  140  139   K  4.5  4.1   CL  108  109   CO2  23  19*   BUN  12  11   CREATININE  0.7  0.7   GLU  107  91   MG   --   2.2   PHOS   --   2.7   CALCIUM  9.7  8.7   ALBUMIN  3.6  3.4*   PROT  6.9  6.9   ALKPHOS  74  73   ALT  16  16   AST  16  24   BILITOT  0.3  0.2       INR  Recent Labs      02/22/18   1533   INR  1.0       Diagnostic Studies:    EKG:  Performed, to be officially read     2D Echo:  None on record     Anesthesia Evaluation    I have reviewed the Patient Summary Reports.     I have reviewed the Medications.     Review of Systems  Anesthesia Hx:  No problems with previous Anesthesia Denies Hx of Anesthetic complications  History of prior surgery of interest to airway management or planning:  Denies Personal Hx of Anesthesia complications.   Hematology/Oncology:     Oncology Normal    -- Anemia:   EENT/Dental:EENT/Dental Normal   Cardiovascular:  Cardiovascular Normal     Pulmonary:  Pulmonary Normal    Renal/:  Renal/ Normal     Hepatic/GI:   PUD,    Musculoskeletal:   Chronic knee pain    Neurological:  Neurology Normal    Endocrine:  Endocrine Normal    Dermatological:  Skin Normal    Psych:   Psychiatric History          Physical Exam  General:  Well nourished    Airway/Jaw/Neck:  Airway Findings: Mouth Opening: Normal Tongue: Normal  General Airway Assessment: Adult  Mallampati: I  TM Distance: Normal, at least 6 cm  Jaw/Neck Findings:  Neck ROM: Normal ROM     Eyes/Ears/Nose:  EYES/EARS/NOSE FINDINGS: Normal   Dental:  Dental Findings: In tact        Mental Status:  Mental Status Findings:  Cooperative, Alert and Oriented         Anesthesia Plan  Type of Anesthesia, risks & benefits discussed:  Anesthesia Type:  MAC,  general  Patient's Preference:   Intra-op Monitoring Plan: standard ASA monitors  Intra-op Monitoring Plan Comments:   Post Op Pain Control Plan:   Post Op Pain Control Plan Comments:   Induction:   IV  Beta Blocker:  Patient is not currently on a Beta-Blocker (No further documentation required).       Informed Consent: Patient understands risks and agrees with Anesthesia plan.  Questions answered. Anesthesia consent signed with patient.  ASA Score: 3     Day of Surgery Review of History & Physical:    H&P update referred to the surgeon.         Ready For Surgery From Anesthesia Perspective.

## 2018-02-23 NOTE — ANESTHESIA RELEASE NOTE
"Anesthesia Release from PACU Note    Patient: Dora Correa    Procedure(s) Performed: Procedure(s) (LRB):  ESOPHAGOGASTRODUODENOSCOPY (EGD) (N/A)    Anesthesia type: general    Post pain: Adequate analgesia    Post assessment: no apparent anesthetic complications, tolerated procedure well and no evidence of recall    Last Vitals:   Visit Vitals  /62   Pulse 71   Temp 36.6 °C (97.9 °F) (Temporal)   Resp 16   Ht 5' 7" (1.702 m)   Wt 90.7 kg (200 lb)   SpO2 99%   Breastfeeding? No   BMI 31.32 kg/m²       Post vital signs: stable    Level of consciousness: awake, alert  and oriented    Nausea/Vomiting: no nausea/no vomiting    Complications: none    Airway Patency: patent    Respiratory: unassisted    Cardiovascular: stable and blood pressure at baseline    Hydration: euvolemic  "

## 2018-02-23 NOTE — PLAN OF CARE
02/23/18 1410   Discharge Assessment   Assessment Type Discharge Planning Assessment   Confirmed/corrected address and phone number on facesheet? Yes   Assessment information obtained from? Patient   Expected Length of Stay (days) 1   Communicated expected length of stay with patient/caregiver yes   Prior to hospitilization cognitive status: Alert/Oriented   Prior to hospitalization functional status: Independent   Current cognitive status: Alert/Oriented   Current Functional Status: Independent   Lives With spouse  (spouse, Refugio Correa ([phone]))   Able to Return to Prior Arrangements yes   Is patient able to care for self after discharge? Yes   Patient's perception of discharge disposition home or selfcare   Readmission Within The Last 30 Days no previous admission in last 30 days   Patient currently being followed by outpatient case management? No   Patient currently receives any other outside agency services? No   Equipment Currently Used at Home none   Do you have any problems affording any of your prescribed medications? No   Is the patient taking medications as prescribed? yes   Does the patient have transportation home? Yes   Transportation Available family or friend will provide  (spouse)   Does the patient receive services at the [facility]? No   Discharge Plan A Home with family   Discharge Plan B Home Health   Patient/Family In Agreement With Plan yes     Patient resting quietly in bed when CM rounded. No family present. Patient was admitted with an acute upper GI bleed. GI consult noted. Patient had an EGD done this AM. 1U PRBC transfusion in progress at this time. Patient's H&H 7.0/21.3 this AM. Patient lives with her spouse, Refugio Correa, & is independent of all ADLs. Plan to discharge patient home with support or home with home health when medically stable. Patient stated that Refugio will provide transportation at time of discharge. Will continue to follow.

## 2018-02-23 NOTE — ASSESSMENT & PLAN NOTE
Likely NSAID ulcer.     Plan:  Protonix IV BID  Intravascular resuscitation/support with IVFs   Serial H/H's and pRBCs transfusion as indicated  Discontinue all NSAIDs and Heparin products  Please correct any coagulopathy with platelets and FFP to a goal of platelets >50K and INR <2.0  Maintain IV access with 2 large bore IVs  NPO  Plan for EGD today or emergently if there is hemodynamic compromise in the setting of overt GI bleeding  Please notify GI team if there is significant change in patient's clinical status

## 2018-02-23 NOTE — TRANSFER OF CARE
"Anesthesia Transfer of Care Note    Patient: Dora Correa    Procedure(s) Performed: Procedure(s) (LRB):  ESOPHAGOGASTRODUODENOSCOPY (EGD) (N/A)    Patient location: PACU    Anesthesia Type: general    Transport from OR: Transported from OR on room air with adequate spontaneous ventilation    Post pain: adequate analgesia    Post assessment: no apparent anesthetic complications    Post vital signs: stable    Level of consciousness: awake and alert    Nausea/Vomiting: no nausea/vomiting    Complications: none    Transfer of care protocol was followed      Last vitals:   Visit Vitals  BP (!) 83/48   Pulse 88   Temp 36.6 °C (97.9 °F) (Temporal)   Resp 16   Ht 5' 7" (1.702 m)   Wt 90.7 kg (200 lb)   SpO2 98%   Breastfeeding? No   BMI 31.32 kg/m²     "

## 2018-02-23 NOTE — PROGRESS NOTES
Received pt to floor from ED via stretcher accompanied by escort. aaox 4. Denies pain or discomfort. Respirations even and unlabored. No distress noted. Pt stable. Pt oriented to room and call bell placed within reach.

## 2018-02-23 NOTE — PLAN OF CARE
Problem: Patient Care Overview  Goal: Plan of Care Review  Outcome: Ongoing (interventions implemented as appropriate)  Pt had EGD done this AM. Blood transfusion initiated @ 1140. No transfusion reactions noted. Rate increased to 125 cc/hr. Pt called nurse into room and c/o pain at IV site. Rate decreased to 100 cc/hr but pt still c/o pain. So rate currently @ 75 cc/hr. Tolerating well. Denies any pain/disocomfrt at the present time.  Regular diet order placed. Pt called and placed lunch order. Call light within reach. NAD noted. Will continue to monitor.

## 2018-02-23 NOTE — SUBJECTIVE & OBJECTIVE
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 No pertinent surgical history.    Review of patient's allergies indicates:  No Known Allergies  Family History     None        Social History Main Topics    Smoking status: Former Smoker     Types: Cigarettes     Quit date: 2/22/1986    Smokeless tobacco: Never Used    Alcohol use Yes      Comment: weekly    Drug use: No    Sexual activity: Yes     Partners: Female     Review of Systems   Constitutional: Positive for activity change and appetite change.   HENT: Negative for ear discharge and facial swelling.    Eyes: Negative for photophobia and redness.   Respiratory: Negative for wheezing and stridor.    Cardiovascular: Negative for chest pain and palpitations.   Gastrointestinal: Positive for blood in stool. Negative for abdominal distention, abdominal pain and anal bleeding.   Endocrine: Negative for cold intolerance and heat intolerance.   Genitourinary: Negative for dysuria and frequency.   Skin: Negative for color change and rash.   Allergic/Immunologic: Negative for food allergies.   Neurological: Negative for seizures and numbness.   Hematological: Does not bruise/bleed easily.   Psychiatric/Behavioral: Negative for agitation.     Objective:     Vital Signs (Most Recent):  Temp: 98.2 °F (36.8 °C) (02/23/18 0812)  Pulse: 83 (02/23/18 0812)  Resp: 16 (02/23/18 0812)  BP: 117/81 (02/23/18 0812)  SpO2: 97 % (02/23/18 0812) Vital Signs (24h Range):  Temp:  [96.6 °F (35.9 °C)-98.2 °F (36.8 °C)] 98.2 °F (36.8 °C)  Pulse:  [] 83  Resp:  [16-20] 16  SpO2:  [95 %-100 %] 97 %  BP: ()/(54-81) 117/81     Weight: 90.7 kg (200 lb) (02/22/18 2107)  Body mass index is 31.32 kg/m².    No intake or output data in the 24 hours ending 02/23/18 0828    Lines/Drains/Airways     Peripheral Intravenous Line                 Peripheral IV - Single Lumen 02/22/18 1702 Left Hand less than 1 day                 Physical Exam   Constitutional: She is oriented to person, place, and time. She appears well-developed and well-nourished.   Eyes: No scleral icterus.   Neck: Neck supple.   Cardiovascular: Normal rate.  Exam reveals no gallop.    Pulmonary/Chest: Effort normal. No respiratory distress.   Abdominal: Soft. Bowel sounds are normal. She exhibits no distension and no mass. There is no tenderness. There is no rebound and no guarding. No hernia.   Musculoskeletal: She exhibits no edema.   Neurological: She is alert and oriented to person, place, and time.   Skin: Skin is warm.   Psychiatric: She has a normal mood and affect.     Lab Results   Component Value Date    WBC 5.21 02/23/2018    HGB 7.0 (L) 02/23/2018    HCT 21.3 (L) 02/23/2018    MCV 96 02/23/2018     (H) 02/23/2018     Lab Results   Component Value Date    INR 1.0 02/22/2018     Lab Results   Component Value Date     02/23/2018    K 3.7 02/23/2018    CREATININE 0.7 02/23/2018     Lab Results   Component Value Date    ALBUMIN 3.4 (L) 02/22/2018    ALT 16 02/22/2018    AST 24 02/22/2018    ALKPHOS 73 02/22/2018    BILITOT 0.2 02/22/2018     No results found for: AFP  Lab Results   Component Value Date    LIPASE 21 02/22/2018     No results found for: TACROLIMUS    Imaging:  Reviewed and as noted in HPI.

## 2018-02-23 NOTE — CONSULTS
[facility]  Gastroenterology  Consult Note    Patient Name: Dora Correa  MRN: [id number]  Admission Date: 2/22/2018  Hospital Length of Stay: 0 days  Code Status: Full Code   Attending Provider: Johanna Rosas MD   Consulting Provider: Buck Plascencia MD  Primary Care Physician: Primary Doctor No  Principal Problem:Acute upper GI bleed    Inpatient consult to Gastroenterology  Consult performed by: BUCK PLASCENCIA  Consult ordered by: CHRIS ELAM JR  Reason for consult: GIB        Subjective:     HPI:  Dora Correa is a 62 y.o. female with PMHx of depression, chronic knee pain, and gastric ulcers being admitted to observation for evaluation of acute upper GI bleed. Patient was sent to the ED from PCP for H&H of 8.7 which went down to 7 from baseline 12. Patient states she noticed that she had dark stools that started around 5 days ago and have been progressive since that time. Denies any vaginal bleeding/hematuria. No nausea/vomiting. No known bleeding disorders. Patient endorses associated fatigue so intense she could not get out of bed. She had a similar complaint 5 years ago living in [place] at which point she had a confirmed gastric ulcer.  At that time they did EGD which found that the ulcer. Patient states she was taking NSAIDs daily until that time. Over the last several months, she reports taking Aleve daily with additional Advil. In the ED, vitals stable. GI consulted for further evaluation.    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 No pertinent surgical history.    Review of patient's allergies indicates:  No Known Allergies  Family History     None        Social History Main Topics    Smoking status: Former Smoker     Types: Cigarettes     Quit date: 2/22/1986    Smokeless tobacco: Never Used    Alcohol use Yes      Comment: weekly    Drug use: No    Sexual activity: Yes      Partners: Female     Review of Systems   Constitutional: Positive for activity change and appetite change.   HENT: Negative for ear discharge and facial swelling.    Eyes: Negative for photophobia and redness.   Respiratory: Negative for wheezing and stridor.    Cardiovascular: Negative for chest pain and palpitations.   Gastrointestinal: Positive for blood in stool. Negative for abdominal distention, abdominal pain and anal bleeding.   Endocrine: Negative for cold intolerance and heat intolerance.   Genitourinary: Negative for dysuria and frequency.   Skin: Negative for color change and rash.   Allergic/Immunologic: Negative for food allergies.   Neurological: Negative for seizures and numbness.   Hematological: Does not bruise/bleed easily.   Psychiatric/Behavioral: Negative for agitation.     Objective:     Vital Signs (Most Recent):  Temp: 98.2 °F (36.8 °C) (02/23/18 0812)  Pulse: 83 (02/23/18 0812)  Resp: 16 (02/23/18 0812)  BP: 117/81 (02/23/18 0812)  SpO2: 97 % (02/23/18 0812) Vital Signs (24h Range):  Temp:  [96.6 °F (35.9 °C)-98.2 °F (36.8 °C)] 98.2 °F (36.8 °C)  Pulse:  [] 83  Resp:  [16-20] 16  SpO2:  [95 %-100 %] 97 %  BP: ()/(54-81) 117/81     Weight: 90.7 kg (200 lb) (02/22/18 2107)  Body mass index is 31.32 kg/m².    No intake or output data in the 24 hours ending 02/23/18 0828    Lines/Drains/Airways     Peripheral Intravenous Line                 Peripheral IV - Single Lumen 02/22/18 1702 Left Hand less than 1 day                Physical Exam   Constitutional: She is oriented to person, place, and time. She appears well-developed and well-nourished.   Eyes: No scleral icterus.   Neck: Neck supple.   Cardiovascular: Normal rate.  Exam reveals no gallop.    Pulmonary/Chest: Effort normal. No respiratory distress.   Abdominal: Soft. Bowel sounds are normal. She exhibits no distension and no mass. There is no tenderness. There is no rebound and no guarding. No hernia.   Musculoskeletal: She  exhibits no edema.   Neurological: She is alert and oriented to person, place, and time.   Skin: Skin is warm.   Psychiatric: She has a normal mood and affect.     Lab Results   Component Value Date    WBC 5.21 02/23/2018    HGB 7.0 (L) 02/23/2018    HCT 21.3 (L) 02/23/2018    MCV 96 02/23/2018     (H) 02/23/2018     Lab Results   Component Value Date    INR 1.0 02/22/2018     Lab Results   Component Value Date     02/23/2018    K 3.7 02/23/2018    CREATININE 0.7 02/23/2018     Lab Results   Component Value Date    ALBUMIN 3.4 (L) 02/22/2018    ALT 16 02/22/2018    AST 24 02/22/2018    ALKPHOS 73 02/22/2018    BILITOT 0.2 02/22/2018     No results found for: AFP  Lab Results   Component Value Date    LIPASE 21 02/22/2018     No results found for: TACROLIMUS    Imaging:  Reviewed and as noted in HPI.         Assessment/Plan:     * Acute upper GI bleed    Likely NSAID ulcer.     Plan:  Protonix IV BID  Intravascular resuscitation/support with IVFs   Serial H/H's and pRBCs transfusion as indicated  Discontinue all NSAIDs and Heparin products  Please correct any coagulopathy with platelets and FFP to a goal of platelets >50K and INR <2.0  Maintain IV access with 2 large bore IVs  NPO  Plan for EGD today or emergently if there is hemodynamic compromise in the setting of overt GI bleeding  Colonoscopy as outpatient  Please notify GI team if there is significant change in patient's clinical status              Thank you for your consult. I will follow-up with patient. Please contact us if you have any additional questions.    Trey Tatum MD  Gastroenterology  [facility]

## 2018-02-23 NOTE — ANESTHESIA POSTPROCEDURE EVALUATION
"Anesthesia Post Evaluation    Patient: Dora Correa    Procedure(s) Performed: Procedure(s) (LRB):  ESOPHAGOGASTRODUODENOSCOPY (EGD) (N/A)    Final Anesthesia Type: general  Patient location during evaluation: [facility]  Patient participation: Yes- Able to Participate  Level of consciousness: awake and alert and oriented  Post-procedure vital signs: reviewed and stable  Pain management: adequate  Airway patency: patent  PONV status at discharge: No PONV  Anesthetic complications: no      Cardiovascular status: blood pressure returned to baseline and hemodynamically stable  Respiratory status: unassisted, spontaneous ventilation and room air  Hydration status: euvolemic  Follow-up not needed.        Visit Vitals  /61   Pulse 75   Temp 36.1 °C (97 °F) (Oral)   Resp 16   Ht 5' 7" (1.702 m)   Wt 90.7 kg (200 lb)   SpO2 100%   Breastfeeding? No   BMI 31.32 kg/m²       Pain/Jhony Score: Pain Assessment Performed: Yes (2/23/2018 10:15 AM)  Presence of Pain: denies (2/23/2018 10:15 AM)  Jhony Score: 10 (2/23/2018 10:15 AM)      "

## 2018-02-23 NOTE — PLAN OF CARE
Hospital follow up appointment scheduled for the patient with ABDON Magallon on 3/6/18 at 0840 & appointment to establish care with a new PCP, Dr. Norris Farrar, scheduled for the patient on 3/27/18. Will continue to follow.

## 2018-02-23 NOTE — TELEPHONE ENCOUNTER
----- Message from Mark Deras sent at 2/22/2018  5:52 PM CST -----  Contact: Pt's   Pt spouse called about pt being told to go to the ER and would like the  To call him back to discuss further information.      Pt's spouse can be reached at [phone]      Thank You

## 2018-02-23 NOTE — NURSING TRANSFER
Nursing Transfer Note      2/23/2018     Transfer To: OBS 7    Transfer via stretcher    Transfer with cardiac monitoring    Transported by pct    Medicines sent: none    Chart send with patient: Yes    Notified: family in room    Patient reassessed at: 2/23/18 2783

## 2018-02-23 NOTE — PROGRESS NOTES
Pt discharged and ambulated off unit with spouse. VSS, NAD noted. Discharge instructions reviewed, pt verbalized understanding. IV removed with catheter intact.

## 2018-02-23 NOTE — HPI
Dora Correa is a 62 y.o. female with PMHx of depression, chronic knee pain, and gastric ulcers being admitted to observation for evaluation of acute upper GI bleed. Patient was sent to the ED from PCP for H&H of 8.7 which went down to 7 from baseline 12. Patient states she noticed that she had dark stools that started around 5 days ago and have been progressive since that time. Denies any vaginal bleeding/hematuria. No nausea/vomiting. No known bleeding disorders. Patient endorses associated fatigue so intense she could not get out of bed. She had a similar complaint 5 years ago living in [place] at which point she had a confirmed gastric ulcer.  At that time they did EGD which found that the ulcer. Patient states she was taking NSAIDs daily until that time. Over the last several months, she reports taking Aleve daily with additional Advil. In the ED, vitals stable. GI consulted for further evaluation.

## 2018-02-23 NOTE — PROGRESS NOTES
Pt back from EGD. Up in restroom right now. Heart monitor in place. NAD noted.     Unit of blood requested from blood bank.

## 2018-02-24 PROBLEM — K92.2 ACUTE UPPER GI BLEED: Status: RESOLVED | Noted: 2018-02-23 | Resolved: 2018-02-24

## 2018-02-24 NOTE — DISCHARGE SUMMARY
Discharge Summary  Hospital Medicine    Attending Provider on Discharge: Jared Morley PA-C  [facility] Medicine Team: [facility]OSP MED E  Date of Admission:  2/22/2018     Date of Discharge:  2/23/2018    Active Hospital Problems    Diagnosis  POA    Depression [F32.9]  Yes    History of gastric ulcer [Z87.19]  Not Applicable    Chronic pain of right knee [M25.561, G89.29]  Yes      Resolved Hospital Problems    Diagnosis Date Resolved POA    *Acute upper GI bleed [K92.2] 02/24/2018 Yes       History of the Present Illness:      Patient is a 63yo F with PMHx of depression, chronic knee pain, and gastric ulcers being admitted to observation for evaluation of acute upper GI bleed. Patient was sent to the ED from PCP for H&H of 8.7. Patient states she noticed that she had dark stools that started around 5 days ago and have been progressive since that time. Denies any vaginal bleeding/hematuria. No nausea/vomiting. No known bleeding disorders. Patient endorses associated fatigue so intense she could not get out of bed. She had a similar complaint 5 years ago living in [place] at which point she had a confirmed gastric ulcer.  At that time they did EGD which found that the ulcer. Patient states she was taking NSAIDs daily until that time. Over the last several months, she reports taking Aleve daily with additional Advil.    Hospital Course of Principle Problem Addressed:       Acute upper GI bleed  History of gastric ulcer  Chronic pain of right knee  Patient admitted to observation for evaluation of Acute UGIB. Hgb 8.7 on admission, vitals stable. Patient consented for transfusion if needed. GI consulted, EGD completed with identification of non bleeding ulcers in pylorus. Hgb 7 prior to EGD, given 1 unit of blood after procedure with good response. Patient started on PPI and Carafate, per GI stable for discharge with follow up in 8 weeks. Patient instructed to discontinue all NSAIDs, expressed  understanding.    Other Medical Problems Addressed in the Hospital:     Depression  - continue Fluoxetine 40mg daily    Significant diagnostic tests       Recent Labs  Lab 02/21/18  1710 02/22/18  1533 02/23/18  0419   WBC 9.61 8.17 5.21   HGB 8.7* 8.7* 7.0*   HCT 26.1* 26.2* 21.3*   * 464* 356*       Recent Labs  Lab 02/21/18  1710 02/22/18  1533 02/23/18  0419    139 142   K 4.5 4.1 3.7    109 112*   CO2 23 19* 26   BUN 12 11 12   CREATININE 0.7 0.7 0.7   CALCIUM 9.7 8.7 8.1*   MG  --  2.2  --    PHOS  --  2.7  --    PROT 6.9 6.9  --    BILITOT 0.3 0.2  --    ALKPHOS 74 73  --    ALT 16 16  --    AST 16 24  --      EGD:   Findings:       LA Grade B (one or more mucosal breaks greater than 5 mm, not        extending between the tops of two mucosal folds) esophagitis with no        bleeding was found at the gastroesophageal junction.       A small hiatal hernia was present.       Two non-bleeding cratered gastric ulcers with no stigmata of        bleeding were found in the prepyloric region of the stomach. The        largest lesion was 4 mm in largest dimension. Biopsies were taken        with a cold forceps for Helicobacter pylori testing. Estimated blood        loss was minimal.       The examined duodenum was normal.  Impression:           - LA Grade B reflux esophagitis.                        - Small hiatal hernia.                        - Non-bleeding gastric ulcers with no stigmata of                         bleeding, stomach biopsied to r/o H.pylori.  Recommendation:       - Patient has a contact number available for                         emergencies. The signs and symptoms of potential                         delayed complications were discussed with the                         patient. Return to normal activities tomorrow.                         Written discharge instructions were provided to the                         patient.                        - Discharge patient to home.                         - Resume previous diet.                        - Continue present medications.                        - Await pathology results.                        - Use Prilosec (omeprazole) 40 mg PO daily.                        - Use sucralfate suspension 1 gram PO BID for 2                         weeks.                        - No aspirin, ibuprofen, naproxen, or other                         non-steroidal anti-inflammatory drugs.      Special Treatments/ Procedures:      none     Discharge Medications:      Discharge Medication List as of 2/23/2018  3:20 PM      START taking these medications    Details   ferrous sulfate 325 (65 FE) MG EC tablet Take 1 tablet (325 mg total) by mouth once daily., Starting Sat 2/24/2018, Normal      sucralfate (CARAFATE) 100 mg/mL suspension Take 10 mLs (1 g total) by mouth 2 (two) times daily., Starting Fri 2/23/2018, Until Fri 3/9/2018, Normal         CONTINUE these medications which have NOT CHANGED    Details   FLUoxetine (PROZAC) 40 MG capsule Take 40 mg by mouth once daily., Historical Med      pantoprazole (PROTONIX) 40 MG tablet Take 1 tablet (40 mg total) by mouth 2 (two) times daily., Starting Wed 2/21/2018, Until Thu 2/21/2019, Normal             Discharge Diet:regular diet with Normal Fluid intake of 1500 - 2000 mL per day    Activity: activity as tolerated    Discharge Condition: Good    Disposition: Home or Self Care    Tests pending at the time of discharge: none      Time spent  on the discharge of the patient including review of hospital course with the patient. reviewing discharge medications and arranging follow-up care 36 minutes    Discharge examination Patient was seen and examined on the date of discharge and determined to be suitable for discharge.    Future Appointments  Date Time Provider Department Center   3/6/2018 8:40 AM David Magallon MD [facility] IM [facility]   3/20/2018 8:00 AM Malorie Longoria PA-C [facility] GASTRO [address]   3/27/2018  3:40 PM Norris Farrar MD [facility] Leslie Morley PA-C   [facility] Medicine  Discussed with staff, Dr. Rosas

## 2018-02-26 NOTE — PLAN OF CARE
02/26/18 0605   Final Note   Assessment Type Final Discharge Note     Patient discharged home with no needs 2/23/18.

## 2018-02-27 ENCOUNTER — TELEPHONE (OUTPATIENT)
Dept: GASTROENTEROLOGY | Facility: CLINIC | Age: 63
End: 2018-02-27

## 2018-02-27 NOTE — TELEPHONE ENCOUNTER
----- Message from Carl Trevino MD sent at 2/27/2018  9:23 AM CST -----  Please notify patient, the stomach biopsies did not reveal any H.pylori.

## 2018-02-28 ENCOUNTER — TELEPHONE (OUTPATIENT)
Dept: INTERNAL MEDICINE | Facility: CLINIC | Age: 63
End: 2018-02-28

## 2018-02-28 NOTE — TELEPHONE ENCOUNTER
----- Message from Chato Win sent at 2/28/2018 11:43 AM CST -----  Contact: Pt  X_ 1st Request  _ 2nd Request  _ 3rd Request    Who:AKHIL KAHN [[id number]]    Why: Patient would like to speak with staff in regards to a sooner appointment with Dr. Farrar for a hospital follow up. Patient does not want to see Dr. Magallon on Monday. Patient states there are earlier appointments online that she cannot schedule for and would like to speak with staff.     What Number to Call Back: [phone]    When to Expect a call back: (Before the end of the day)  -- if call after 3:00 call back will be tomorrow.

## 2018-02-28 NOTE — TELEPHONE ENCOUNTER
Spoke with pt. She states she wants to est care with Dr. Farrar because her  is a pt and she also just got out of hospital with bleeding ulcers. Got her an appt with Dr. Farrar for both est and follow up 3/6/18 140pm

## 2018-03-05 ENCOUNTER — TELEPHONE (OUTPATIENT)
Dept: INTERNAL MEDICINE | Facility: CLINIC | Age: 63
End: 2018-03-05

## 2018-03-05 NOTE — TELEPHONE ENCOUNTER
chance   I did not book the 3 month f\u with lab she is has an appointment on 3/6 to establish with another staff

## 2018-03-06 ENCOUNTER — LAB VISIT (OUTPATIENT)
Dept: LAB | Facility: OTHER | Age: 63
End: 2018-03-06
Attending: INTERNAL MEDICINE
Payer: COMMERCIAL

## 2018-03-06 ENCOUNTER — OFFICE VISIT (OUTPATIENT)
Dept: INTERNAL MEDICINE | Facility: CLINIC | Age: 63
End: 2018-03-06
Attending: INTERNAL MEDICINE
Payer: COMMERCIAL

## 2018-03-06 VITALS
WEIGHT: 203.69 LBS | DIASTOLIC BLOOD PRESSURE: 70 MMHG | SYSTOLIC BLOOD PRESSURE: 120 MMHG | BODY MASS INDEX: 31.97 KG/M2 | HEIGHT: 67 IN | HEART RATE: 80 BPM

## 2018-03-06 DIAGNOSIS — M25.561 CHRONIC PAIN OF RIGHT KNEE: ICD-10-CM

## 2018-03-06 DIAGNOSIS — K92.2 UPPER GI BLEED: ICD-10-CM

## 2018-03-06 DIAGNOSIS — Z12.12 SCREENING FOR COLORECTAL CANCER: ICD-10-CM

## 2018-03-06 DIAGNOSIS — Z12.4 PAP SMEAR FOR CERVICAL CANCER SCREENING: ICD-10-CM

## 2018-03-06 DIAGNOSIS — Z00.00 ANNUAL PHYSICAL EXAM: Primary | ICD-10-CM

## 2018-03-06 DIAGNOSIS — Z12.39 SCREENING FOR BREAST CANCER: ICD-10-CM

## 2018-03-06 DIAGNOSIS — Z13.220 ENCOUNTER FOR LIPID SCREENING FOR CARDIOVASCULAR DISEASE: ICD-10-CM

## 2018-03-06 DIAGNOSIS — Z85.828 HISTORY OF SQUAMOUS CELL CARCINOMA OF SKIN: ICD-10-CM

## 2018-03-06 DIAGNOSIS — Z11.59 NEED FOR HEPATITIS C SCREENING TEST: ICD-10-CM

## 2018-03-06 DIAGNOSIS — Z00.00 ANNUAL PHYSICAL EXAM: ICD-10-CM

## 2018-03-06 DIAGNOSIS — Z13.6 ENCOUNTER FOR LIPID SCREENING FOR CARDIOVASCULAR DISEASE: ICD-10-CM

## 2018-03-06 DIAGNOSIS — F32.A DEPRESSION, UNSPECIFIED DEPRESSION TYPE: ICD-10-CM

## 2018-03-06 DIAGNOSIS — G89.29 CHRONIC PAIN OF RIGHT KNEE: ICD-10-CM

## 2018-03-06 DIAGNOSIS — Z12.11 SCREENING FOR COLORECTAL CANCER: ICD-10-CM

## 2018-03-06 LAB
BASOPHILS # BLD AUTO: 0.05 K/UL
BASOPHILS NFR BLD: 0.8 %
CHOLEST SERPL-MCNC: 227 MG/DL
CHOLEST/HDLC SERPL: 3.8 {RATIO}
DIFFERENTIAL METHOD: ABNORMAL
EOSINOPHIL # BLD AUTO: 0.1 K/UL
EOSINOPHIL NFR BLD: 1.8 %
ERYTHROCYTE [DISTWIDTH] IN BLOOD BY AUTOMATED COUNT: 13.8 %
ESTIMATED AVG GLUCOSE: 82 MG/DL
HBA1C MFR BLD HPLC: 4.5 %
HCT VFR BLD AUTO: 34.5 %
HDLC SERPL-MCNC: 60 MG/DL
HDLC SERPL: 26.4 %
HGB BLD-MCNC: 11.1 G/DL
LDLC SERPL CALC-MCNC: 143 MG/DL
LYMPHOCYTES # BLD AUTO: 2.3 K/UL
LYMPHOCYTES NFR BLD: 37.6 %
MCH RBC QN AUTO: 30.5 PG
MCHC RBC AUTO-ENTMCNC: 32.2 G/DL
MCV RBC AUTO: 95 FL
MONOCYTES # BLD AUTO: 0.4 K/UL
MONOCYTES NFR BLD: 6.2 %
NEUTROPHILS # BLD AUTO: 3.2 K/UL
NEUTROPHILS NFR BLD: 53.4 %
NONHDLC SERPL-MCNC: 167 MG/DL
PLATELET # BLD AUTO: 520 K/UL
PMV BLD AUTO: 9.6 FL
RBC # BLD AUTO: 3.64 M/UL
TRIGL SERPL-MCNC: 120 MG/DL
WBC # BLD AUTO: 5.99 K/UL

## 2018-03-06 PROCEDURE — 36415 COLL VENOUS BLD VENIPUNCTURE: CPT

## 2018-03-06 PROCEDURE — 99999 PR PBB SHADOW E&M-EST. PATIENT-LVL V: CPT | Mod: PBBFAC,,, | Performed by: INTERNAL MEDICINE

## 2018-03-06 PROCEDURE — 85025 COMPLETE CBC W/AUTO DIFF WBC: CPT

## 2018-03-06 PROCEDURE — 90736 HZV VACCINE LIVE SUBQ: CPT | Mod: S$GLB,,, | Performed by: INTERNAL MEDICINE

## 2018-03-06 PROCEDURE — 90715 TDAP VACCINE 7 YRS/> IM: CPT | Mod: S$GLB,,, | Performed by: INTERNAL MEDICINE

## 2018-03-06 PROCEDURE — 83036 HEMOGLOBIN GLYCOSYLATED A1C: CPT

## 2018-03-06 PROCEDURE — 86803 HEPATITIS C AB TEST: CPT

## 2018-03-06 PROCEDURE — 90472 IMMUNIZATION ADMIN EACH ADD: CPT | Mod: S$GLB,,, | Performed by: INTERNAL MEDICINE

## 2018-03-06 PROCEDURE — 90471 IMMUNIZATION ADMIN: CPT | Mod: S$GLB,,, | Performed by: INTERNAL MEDICINE

## 2018-03-06 PROCEDURE — 99396 PREV VISIT EST AGE 40-64: CPT | Mod: 25,S$GLB,, | Performed by: INTERNAL MEDICINE

## 2018-03-06 PROCEDURE — 80061 LIPID PANEL: CPT

## 2018-03-06 RX ORDER — DESVENLAFAXINE SUCCINATE 50 MG/1
50 TABLET, EXTENDED RELEASE ORAL DAILY
Qty: 90 TABLET | Refills: 1 | Status: SHIPPED | OUTPATIENT
Start: 2018-03-06 | End: 2018-05-14 | Stop reason: SDUPTHER

## 2018-03-06 NOTE — PROGRESS NOTES
Subjective:       Patient ID: Dora Correa is a 62 y.o. female.    Chief Complaint: Establish Care and Hospital Follow Up    Here to establish care    Admitted 2/22/18-2/23/18 for drop in H/H and 5 day Hx of melena.  EGD completed with identification of non bleeding ulcers in pylorus. Hgb 7 prior to EGD, given 1 unit of blood after procedure with good response. Patient started on PPI and Carafate. Hx of simialr presentation in [place] and requried pRBC for bleeding ucler. Was on NSAIDS at that time and this episode. Denies continued melena, BRBPR, SOB, dizziness. Takes NSAIDS for chronic knee pain R>L. This is chronic and has not responded to several injections or viscous supplementation. Hx of depression that is overall stable. Reports a strong family hx of dysthymia.             Review of Systems   Constitutional: Negative for chills, fatigue, fever and unexpected weight change.   HENT: Negative for ear pain, hearing loss, postnasal drip, tinnitus, trouble swallowing and voice change.    Respiratory: Negative for cough, chest tightness, shortness of breath and wheezing.    Cardiovascular: Negative for chest pain, palpitations and leg swelling.   Gastrointestinal: Negative for abdominal pain, blood in stool, diarrhea, nausea and vomiting.   Endocrine: Negative for polydipsia, polyphagia and polyuria.   Genitourinary: Negative for difficulty urinating, dysuria, hematuria and vaginal bleeding.   Skin: Negative for rash.   Allergic/Immunologic: Negative for food allergies.   Neurological: Negative for dizziness, numbness and headaches.   Hematological: Does not bruise/bleed easily.   Psychiatric/Behavioral: The patient is not nervous/anxious.        Past Medical History:   Diagnosis Date    Squamous cell carcinoma 1999    L cheek     History reviewed. No pertinent surgical history.  Family History   Problem Relation Age of Onset    Melanoma Neg Hx      Social History     Social History    Marital status:  "Unknown     Spouse name: N/A    Number of children: N/A    Years of education: N/A     Occupational History    Not on file.     Social History Main Topics    Smoking status: Former Smoker     Types: Cigarettes     Quit date: 2/22/1986    Smokeless tobacco: Never Used    Alcohol use Yes      Comment: weekly    Drug use: No    Sexual activity: Yes     Partners: Female     Other Topics Concern    Not on file     Social History Narrative    No narrative on file         Objective:      Vitals:    03/06/18 1403   BP: 120/70   Pulse: 80   Weight: 92.4 kg (203 lb 11.3 oz)   Height: 5' 7" (1.702 m)      Physical Exam   Constitutional: She is oriented to person, place, and time. She appears well-developed and well-nourished. No distress.   HENT:   Head: Normocephalic and atraumatic.   Mouth/Throat: Oropharynx is clear and moist. No oropharyngeal exudate.   Eyes: Conjunctivae and EOM are normal. Pupils are equal, round, and reactive to light. No scleral icterus.   Neck: No thyromegaly present.   Cardiovascular: Normal rate, regular rhythm and normal heart sounds.    No murmur heard.  Pulmonary/Chest: Effort normal and breath sounds normal. She has no wheezes. She has no rales.   Abdominal: Soft. She exhibits no distension. There is no tenderness.   Musculoskeletal: She exhibits no edema or tenderness.   Lymphadenopathy:     She has no cervical adenopathy.   Neurological: She is alert and oriented to person, place, and time.   Skin: Skin is warm and dry.   Psychiatric: She has a normal mood and affect. Her behavior is normal.       Assessment:       1. Annual physical exam    2. Need for hepatitis C screening test    3. Encounter for lipid screening for cardiovascular disease    4. Pap smear for cervical cancer screening    5. Screening for breast cancer    6. Screening for colorectal cancer    7. History of squamous cell carcinoma of skin    8. Upper GI bleed    9. Chronic pain of right knee    10. Depression, " unspecified depression type        Plan:       Dora was seen today for establish care and hospital follow up.    Diagnoses and all orders for this visit:    Annual physical exam  -     Hemoglobin A1c; Future  -     CBC auto differential; Future    Need for hepatitis C screening test  -     Hepatitis C antibody; Future    Encounter for lipid screening for cardiovascular disease  -     Lipid panel; Future    Pap smear for cervical cancer screening  -     Ambulatory referral to Obstetrics / Gynecology    Screening for breast cancer  -     Mammo Digital Screening Bilat with Tomosynthesis CAD; Future    Screening for colorectal cancer   -will obtain old records  History of squamous cell carcinoma of skin  -     Ambulatory referral to Dermatology    Upper GI bleed  -resolved. F/u with GI. Avoid NSAIDS    Chronic pain of right knee  -pt would like to defer ortho referral at this time.    Depression, unspecified depression type  Provided with cross-taper  -     desvenlafaxine succinate (PRISTIQ) 50 MG Tb24; Take 1 tablet (50 mg total) by mouth once daily.    Other orders  -     Tdap Vaccine  -     Zoster Vaccine - Live    RTC in 1 year or sooner prn                 Notification of Lab Results: [facility]  Side effects of medication(s) were discussed in detail and patient voiced understanding.  Patient will call back for any issues or complications.

## 2018-03-06 NOTE — PROGRESS NOTES
"Patient was given vaccine information sheet for the Tdap immunization. The area of injection was palpated using the acromion process as a landmark. This area was cleaned with alcohol. Using a 25g 1" safety needle, 0.5mL of the vaccine was placed into the left muscle. The injection site was dressed with a bandage. Patient experienced no complications and was discharged in stable condition. Tdap Lot: [id number] Exp: [id number]      Patient was given vaccine information sheet for the shingles vaccine. Prior to administration, the patient's allergies were reviewed. The area of injection was identified at the back of the right arm where the subcutaneous tissue was gathered up from the muscle. This area was cleaned with alcohol. Using a 25g 5/8" safety needle, 0.65mL of Zostavax diluted material (0.5mL of sterile water and 0.15mL of live Zostavax - mixed prior to administration) was placed into the subcutaneous fatty tissue. The injection site was dressed with a bandage. Patient experienced no complications and was discharged in stable condition. Patient was notified to apply ice if they experienced a localized rash. Zostavax Lot: [id number] Exp: [id number], Sterile Water Lot: [id number], Exp:[id number]      "

## 2018-03-06 NOTE — PATIENT INSTRUCTIONS
Take half tablet of prozac for 2 weeks then start pristiq and take both for 2 weeks then stop prozac

## 2018-03-07 LAB — HCV AB SERPL QL IA: NEGATIVE

## 2018-03-26 ENCOUNTER — TELEPHONE (OUTPATIENT)
Dept: INTERNAL MEDICINE | Facility: CLINIC | Age: 63
End: 2018-03-26

## 2018-03-26 NOTE — TELEPHONE ENCOUNTER
Spoke with pt and pt declined appt stating she is not ready to alcon yet. Reminder will be sent via pt portal. Pt verbally understood and had no further questions.

## 2018-05-14 RX ORDER — DESVENLAFAXINE SUCCINATE 50 MG/1
50 TABLET, EXTENDED RELEASE ORAL DAILY
Qty: 90 TABLET | Refills: 2 | Status: SHIPPED | OUTPATIENT
Start: 2018-05-14 | End: 2018-06-18 | Stop reason: SDUPTHER

## 2018-06-10 ENCOUNTER — PATIENT MESSAGE (OUTPATIENT)
Dept: INTERNAL MEDICINE | Facility: CLINIC | Age: 63
End: 2018-06-10

## 2018-06-18 RX ORDER — DESVENLAFAXINE 100 MG/1
100 TABLET, EXTENDED RELEASE ORAL DAILY
Qty: 90 TABLET | Refills: 2 | Status: SHIPPED | OUTPATIENT
Start: 2018-06-18 | End: 2019-01-31 | Stop reason: SDUPTHER

## 2018-10-24 ENCOUNTER — PATIENT MESSAGE (OUTPATIENT)
Dept: ADMINISTRATIVE | Facility: HOSPITAL | Age: 63
End: 2018-10-24

## 2018-10-24 ENCOUNTER — PATIENT OUTREACH (OUTPATIENT)
Dept: ADMINISTRATIVE | Facility: HOSPITAL | Age: 63
End: 2018-10-24

## 2019-01-31 RX ORDER — DESVENLAFAXINE 100 MG/1
TABLET, EXTENDED RELEASE ORAL
Qty: 90 TABLET | Refills: 0 | Status: SHIPPED | OUTPATIENT
Start: 2019-01-31 | End: 2019-04-17 | Stop reason: SDUPTHER

## 2019-03-04 ENCOUNTER — PATIENT OUTREACH (OUTPATIENT)
Dept: ADMINISTRATIVE | Facility: HOSPITAL | Age: 64
End: 2019-03-04

## 2019-04-17 DIAGNOSIS — F32.A DEPRESSION, UNSPECIFIED DEPRESSION TYPE: Primary | ICD-10-CM

## 2019-04-17 RX ORDER — DESVENLAFAXINE 100 MG/1
TABLET, EXTENDED RELEASE ORAL
Qty: 90 TABLET | Refills: 1 | Status: SHIPPED | OUTPATIENT
Start: 2019-04-17 | End: 2019-10-05 | Stop reason: SDUPTHER

## 2019-04-17 NOTE — TELEPHONE ENCOUNTER
----- Message from Bean Arora sent at 4/17/2019  2:18 PM CDT -----  Contact: AKHIL KAHN [[id number]]  Can the clinic reply in [facility]:      Please refill the medication(s) listed below. Please call the patient when the prescription(s) is ready for  at the phone number    Medication #1:desvenlafaxine succinate (PRISTIQ) 100 M    Medication #2:      Preferred Pharmacy:[facility] [id number] [facility] [address]  [facility]

## 2019-05-01 ENCOUNTER — PATIENT OUTREACH (OUTPATIENT)
Dept: ADMINISTRATIVE | Facility: HOSPITAL | Age: 64
End: 2019-05-01

## 2019-05-01 ENCOUNTER — PATIENT MESSAGE (OUTPATIENT)
Dept: ADMINISTRATIVE | Facility: HOSPITAL | Age: 64
End: 2019-05-01

## 2019-05-10 DIAGNOSIS — Z12.39 BREAST CANCER SCREENING: ICD-10-CM

## 2019-05-15 ENCOUNTER — HOSPITAL ENCOUNTER (OUTPATIENT)
Dept: RADIOLOGY | Facility: OTHER | Age: 64
Discharge: HOME | End: 2019-05-15
Attending: INTERNAL MEDICINE
Payer: COMMERCIAL

## 2019-05-15 ENCOUNTER — OFFICE VISIT (OUTPATIENT)
Dept: INTERNAL MEDICINE | Facility: CLINIC | Age: 64
End: 2019-05-15
Attending: INTERNAL MEDICINE
Payer: COMMERCIAL

## 2019-05-15 ENCOUNTER — HOSPITAL ENCOUNTER (OUTPATIENT)
Dept: CARDIOLOGY | Facility: OTHER | Age: 64
Discharge: HOME | End: 2019-05-15
Attending: INTERNAL MEDICINE
Payer: COMMERCIAL

## 2019-05-15 ENCOUNTER — DOCUMENTATION ONLY (OUTPATIENT)
Dept: INTERNAL MEDICINE | Facility: CLINIC | Age: 64
End: 2019-05-15

## 2019-05-15 VITALS
BODY MASS INDEX: 30.97 KG/M2 | HEART RATE: 71 BPM | DIASTOLIC BLOOD PRESSURE: 83 MMHG | SYSTOLIC BLOOD PRESSURE: 122 MMHG | HEIGHT: 68 IN | OXYGEN SATURATION: 97 %

## 2019-05-15 DIAGNOSIS — Z71.85 VACCINE COUNSELING: ICD-10-CM

## 2019-05-15 DIAGNOSIS — E04.9 ENLARGED THYROID: ICD-10-CM

## 2019-05-15 DIAGNOSIS — Z01.818 PRE-OP EVALUATION: ICD-10-CM

## 2019-05-15 DIAGNOSIS — Z12.11 COLON CANCER SCREENING: ICD-10-CM

## 2019-05-15 DIAGNOSIS — Z00.00 ANNUAL PHYSICAL EXAM: Primary | ICD-10-CM

## 2019-05-15 DIAGNOSIS — Z87.11 HISTORY OF GASTRIC ULCER: ICD-10-CM

## 2019-05-15 DIAGNOSIS — M25.561 CHRONIC PAIN OF RIGHT KNEE: ICD-10-CM

## 2019-05-15 DIAGNOSIS — F32.A DEPRESSION, UNSPECIFIED DEPRESSION TYPE: ICD-10-CM

## 2019-05-15 DIAGNOSIS — G89.29 CHRONIC PAIN OF RIGHT KNEE: ICD-10-CM

## 2019-05-15 PROCEDURE — 93010 EKG 12-LEAD: ICD-10-PCS | Mod: ,,, | Performed by: INTERNAL MEDICINE

## 2019-05-15 PROCEDURE — 76536 US EXAM OF HEAD AND NECK: CPT | Mod: TC

## 2019-05-15 PROCEDURE — 99999 PR PBB SHADOW E&M-EST. PATIENT-LVL III: CPT | Mod: PBBFAC,,, | Performed by: INTERNAL MEDICINE

## 2019-05-15 PROCEDURE — 99999 PR PBB SHADOW E&M-EST. PATIENT-LVL III: ICD-10-PCS | Mod: PBBFAC,,, | Performed by: INTERNAL MEDICINE

## 2019-05-15 PROCEDURE — 99396 PREV VISIT EST AGE 40-64: CPT | Mod: 25,S$GLB,, | Performed by: INTERNAL MEDICINE

## 2019-05-15 PROCEDURE — 99396 PR PREVENTIVE VISIT,EST,40-64: ICD-10-PCS | Mod: 25,S$GLB,, | Performed by: INTERNAL MEDICINE

## 2019-05-15 PROCEDURE — 76536 US SOFT TISSUE HEAD NECK THYROID: ICD-10-PCS | Mod: 26,,, | Performed by: INTERNAL MEDICINE

## 2019-05-15 PROCEDURE — 93005 ELECTROCARDIOGRAM TRACING: CPT

## 2019-05-15 PROCEDURE — 93010 ELECTROCARDIOGRAM REPORT: CPT | Mod: ,,, | Performed by: INTERNAL MEDICINE

## 2019-05-15 PROCEDURE — 76536 US EXAM OF HEAD AND NECK: CPT | Mod: 26,,, | Performed by: INTERNAL MEDICINE

## 2019-05-15 NOTE — Clinical Note
Please print progress note to fax for pre op I have destination for fax on my desk. Please come grab from me. thanks

## 2019-05-16 ENCOUNTER — PATIENT MESSAGE (OUTPATIENT)
Dept: INTERNAL MEDICINE | Facility: CLINIC | Age: 64
End: 2019-05-16

## 2019-05-16 PROBLEM — E04.1 THYROID NODULE: Status: ACTIVE | Noted: 2019-05-16

## 2019-05-28 ENCOUNTER — TELEPHONE (OUTPATIENT)
Dept: INTERNAL MEDICINE | Facility: CLINIC | Age: 64
End: 2019-05-28

## 2019-05-28 NOTE — TELEPHONE ENCOUNTER
----- Message from Norris Farrar MD sent at 5/27/2019  5:52 PM CDT -----  Please print progress note to fax for pre op I have destination for fax on my desk. Please come grab from me. thanks

## 2019-06-11 ENCOUNTER — PATIENT MESSAGE (OUTPATIENT)
Dept: INTERNAL MEDICINE | Facility: CLINIC | Age: 64
End: 2019-06-11

## 2019-06-11 DIAGNOSIS — H91.90 HEARING TROUBLE, UNSPECIFIED LATERALITY: Primary | ICD-10-CM

## 2019-08-22 ENCOUNTER — CLINICAL SUPPORT (OUTPATIENT)
Dept: OTOLARYNGOLOGY | Facility: CLINIC | Age: 64
End: 2019-08-22
Attending: INTERNAL MEDICINE
Payer: COMMERCIAL

## 2019-08-22 ENCOUNTER — LAB VISIT (OUTPATIENT)
Dept: LAB | Facility: OTHER | Age: 64
End: 2019-08-22
Payer: COMMERCIAL

## 2019-08-22 VITALS
HEIGHT: 68 IN | TEMPERATURE: 98 F | HEART RATE: 83 BPM | BODY MASS INDEX: 29.73 KG/M2 | WEIGHT: 196.19 LBS | SYSTOLIC BLOOD PRESSURE: 118 MMHG | DIASTOLIC BLOOD PRESSURE: 82 MMHG

## 2019-08-22 DIAGNOSIS — Z77.122 HISTORY OF EXPOSURE TO NOISE: ICD-10-CM

## 2019-08-22 DIAGNOSIS — R29.2 ABNORMAL ACOUSTIC REFLEX: ICD-10-CM

## 2019-08-22 DIAGNOSIS — H91.93 HEARING DIFFICULTY OF BOTH EARS: ICD-10-CM

## 2019-08-22 DIAGNOSIS — H90.3 ASYMMETRICAL SENSORINEURAL HEARING LOSS: ICD-10-CM

## 2019-08-22 DIAGNOSIS — H90.3 ASYMMETRICAL SENSORINEURAL HEARING LOSS: Primary | ICD-10-CM

## 2019-08-22 DIAGNOSIS — H93.13 TINNITUS OF BOTH EARS: ICD-10-CM

## 2019-08-22 DIAGNOSIS — H93.13 BILATERAL TINNITUS: ICD-10-CM

## 2019-08-22 LAB
CREAT SERPL-MCNC: 0.8 MG/DL (ref 0.5–1.4)
EST. GFR  (AFRICAN AMERICAN): >60 ML/MIN/1.73 M^2
EST. GFR  (NON AFRICAN AMERICAN): >60 ML/MIN/1.73 M^2

## 2019-08-22 PROCEDURE — 99204 OFFICE O/P NEW MOD 45 MIN: CPT | Mod: S$GLB,,, | Performed by: OTOLARYNGOLOGY

## 2019-08-22 PROCEDURE — 3008F PR BODY MASS INDEX (BMI) DOCUMENTED: ICD-10-PCS | Mod: CPTII,S$GLB,, | Performed by: OTOLARYNGOLOGY

## 2019-08-22 PROCEDURE — 92557 PR COMPREHENSIVE HEARING TEST: ICD-10-PCS | Mod: S$GLB,,, | Performed by: AUDIOLOGIST-HEARING AID FITTER

## 2019-08-22 PROCEDURE — 3008F BODY MASS INDEX DOCD: CPT | Mod: CPTII,S$GLB,, | Performed by: OTOLARYNGOLOGY

## 2019-08-22 PROCEDURE — 92557 COMPREHENSIVE HEARING TEST: CPT | Mod: S$GLB,,, | Performed by: AUDIOLOGIST-HEARING AID FITTER

## 2019-08-22 PROCEDURE — 36415 COLL VENOUS BLD VENIPUNCTURE: CPT

## 2019-08-22 PROCEDURE — 82565 ASSAY OF CREATININE: CPT

## 2019-08-22 PROCEDURE — 92550 PR TYMPANOMETRY AND REFLEX THRESHOLD MEASUREMENTS: ICD-10-PCS | Mod: S$GLB,,, | Performed by: AUDIOLOGIST-HEARING AID FITTER

## 2019-08-22 PROCEDURE — 99204 PR OFFICE/OUTPT VISIT, NEW, LEVL IV, 45-59 MIN: ICD-10-PCS | Mod: S$GLB,,, | Performed by: OTOLARYNGOLOGY

## 2019-08-22 PROCEDURE — 92550 TYMPANOMETRY & REFLEX THRESH: CPT | Mod: S$GLB,,, | Performed by: AUDIOLOGIST-HEARING AID FITTER

## 2019-08-22 NOTE — LETTER
September 5, 2019      Norris Farrar MD  [address]  [address]  [facility] [address]           [facility] [address]  [address]  [facility] [address]  Phone: [phone]  Fax: [phone]          Patient: Dora Correa   MR Number: [id number]   YOB: 1955   Date of Visit: 8/22/2019       Dear Dr. Norris Farrar:    Thank you for referring Dora Correa to me for evaluation. Attached you will find relevant portions of my assessment and plan of care.    If you have questions, please do not hesitate to call me. I look forward to following Dora Correa along with you.    Sincerely,    Jordan Alfaro  CC:  No Recipients    If you would like to receive this communication electronically, please contact [email or web address] or [phone] to request more information on [employer] access.    For providers and/or their staff who would like to refer a patient to [facility], please contact us through our one-stop-shop provider referral line, [facility], at [phone].    If you feel you have received this communication in error or would no longer like to receive these types of communications, please e-mail [email or web address]

## 2019-08-23 ENCOUNTER — PATIENT MESSAGE (OUTPATIENT)
Dept: OTOLARYNGOLOGY | Facility: CLINIC | Age: 64
End: 2019-08-23

## 2019-08-26 NOTE — TELEPHONE ENCOUNTER
Spoke with patient told her  Would prefer [facility] facility only because she can actually see the films on her computer of the MRI and she looks over those.

## 2019-08-28 ENCOUNTER — HOSPITAL ENCOUNTER (OUTPATIENT)
Dept: RADIOLOGY | Facility: OTHER | Age: 64
Discharge: HOME | End: 2019-08-28
Attending: OTOLARYNGOLOGY
Payer: COMMERCIAL

## 2019-08-28 DIAGNOSIS — H90.3 ASYMMETRICAL SENSORINEURAL HEARING LOSS: ICD-10-CM

## 2019-08-28 PROCEDURE — 70553 MRI BRAIN STEM W/O & W/DYE: CPT | Mod: 26,,, | Performed by: INTERNAL MEDICINE

## 2019-08-28 PROCEDURE — 70553 MRI IAC/TEMPORAL BONES W W/O CONTRAST: ICD-10-PCS | Mod: 26,,, | Performed by: INTERNAL MEDICINE

## 2019-08-28 PROCEDURE — 25500020 PHARM REV CODE 255: Performed by: OTOLARYNGOLOGY

## 2019-08-28 PROCEDURE — 70553 MRI BRAIN STEM W/O & W/DYE: CPT | Mod: TC

## 2019-08-28 PROCEDURE — A9585 GADOBUTROL INJECTION: HCPCS | Performed by: OTOLARYNGOLOGY

## 2019-08-28 RX ORDER — GADOBUTROL 604.72 MG/ML
9 INJECTION INTRAVENOUS
Status: COMPLETED | OUTPATIENT
Start: 2019-08-28 | End: 2019-08-28

## 2019-08-28 RX ADMIN — GADOBUTROL 9 ML: 604.72 INJECTION INTRAVENOUS at 02:08

## 2019-08-28 NOTE — TELEPHONE ENCOUNTER
Called patient few days ago about this and advised can go where she prefers and bring report to follow up if not in [facility].

## 2019-09-05 ENCOUNTER — PATIENT MESSAGE (OUTPATIENT)
Dept: INTERNAL MEDICINE | Facility: CLINIC | Age: 64
End: 2019-09-05

## 2019-09-05 DIAGNOSIS — E78.00 HIGH CHOLESTEROL: Primary | ICD-10-CM

## 2019-09-06 NOTE — TELEPHONE ENCOUNTER
We checked this 05/2019. njo need to repeat at this time.  The 10-year ASCVD risk score (Rekha MARKS Jr., et al., 2013) is: 4%    Values used to calculate the score:      Age: 64 years      Sex: Female      Is Non- : No      Diabetic: No      Tobacco smoker: No      Systolic Blood Pressure: 118 mmHg      Is BP treated: No      HDL Cholesterol: 53 mg/dL      Total Cholesterol: 236 mg/dL    We will repeat this at her annual.

## 2019-09-11 ENCOUNTER — OFFICE VISIT (OUTPATIENT)
Dept: OTOLARYNGOLOGY | Facility: CLINIC | Age: 64
End: 2019-09-11
Payer: COMMERCIAL

## 2019-09-11 VITALS
SYSTOLIC BLOOD PRESSURE: 118 MMHG | HEART RATE: 79 BPM | TEMPERATURE: 98 F | DIASTOLIC BLOOD PRESSURE: 83 MMHG | HEIGHT: 68 IN | WEIGHT: 196 LBS | BODY MASS INDEX: 29.7 KG/M2

## 2019-09-11 DIAGNOSIS — H93.13 BILATERAL TINNITUS: ICD-10-CM

## 2019-09-11 DIAGNOSIS — Z77.122 HISTORY OF EXPOSURE TO NOISE: ICD-10-CM

## 2019-09-11 DIAGNOSIS — H90.3 SENSORINEURAL HEARING LOSS, BILATERAL: ICD-10-CM

## 2019-09-11 DIAGNOSIS — R29.2 ABNORMAL ACOUSTIC REFLEX: ICD-10-CM

## 2019-09-11 PROCEDURE — 3008F PR BODY MASS INDEX (BMI) DOCUMENTED: ICD-10-PCS | Mod: CPTII,S$GLB,, | Performed by: OTOLARYNGOLOGY

## 2019-09-11 PROCEDURE — 3008F BODY MASS INDEX DOCD: CPT | Mod: CPTII,S$GLB,, | Performed by: OTOLARYNGOLOGY

## 2019-09-11 PROCEDURE — 99214 PR OFFICE/OUTPT VISIT, EST, LEVL IV, 30-39 MIN: ICD-10-PCS | Mod: S$GLB,,, | Performed by: OTOLARYNGOLOGY

## 2019-09-11 PROCEDURE — 99214 OFFICE O/P EST MOD 30 MIN: CPT | Mod: S$GLB,,, | Performed by: OTOLARYNGOLOGY

## 2019-09-11 NOTE — PATIENT INSTRUCTIONS
Hearing protection.  Custom ear molds.    Recheck in one year unless change or problems prior.    Follow up MRI questions with PCP.

## 2019-09-11 NOTE — PROGRESS NOTES
Subjective:       Patient ID: Dora Correa is a 64 y.o. female.    Chief Complaint: Hearing Loss    She is a new patient for me here today complaining of difficulty hearing over the past year or so.  Onset has been gradual and hearing seems about the same in both ears.  Denies antecedent acoustical trauma or head trauma or URI.  Grew up in a home with her parents being classical musicians with moderate intensity music exposure.  Has attended [employer] since her 20's.  Wearing earplugs over the past few years and only attending once or twice a year more recently.  Has had tinnitus my whole life which is non localized.  No NSAIDs.  Four caffeinated drinks daily for many years.  No nasal or throat or other otolaryngologic complaints.         Review of Systems   Ears: Positive for hearing loss, ringing in ear and family history of hearing loss.  Negative for ear pain, ear discharge, ear infections, dizziness, head trauma and taken gentramycin/streptomycin.    Nose:  Negative for nasal obstruction, nasal or sinus surgery, postnasal drip and snoring.    Mouth/Throat: Negative for pain swallowing, impaired swallowing, hoarse voice, throat mass and neck mass.   Constitutional: Negative for recent unexplained weight loss and fever.    Eyes:  Negative for visual change.   Cardiovascular:  Negative for chest pain, palpitations and history of high blood pressure.   Respiratory:  Negative for asthma, emphysema, history of tuberculosis, recent cough and shortness of breath.    Gastrointestinal:  Negative for history of stomach ulcers or pain, acid reflux, indigestion and blood in stool.   Other:  Positive for arthritis. Negative for kidney problem, bladder problem, weakness, disturbances in coordination, slurred, swollen glands, anemia and persistent infections.           Objective:        Vitals:    08/22/19 0912   BP: 118/82   Pulse: 83   Temp: 97.7 °F (36.5 °C)     Body mass index is 29.83 kg/m².  Physical  Exam   Constitutional: She is oriented to person, place, and time. She appears well-developed and well-nourished. No distress.   HENT:   Head: Normocephalic and atraumatic.   Right Ear: Tympanic membrane, external ear and ear canal normal.   Left Ear: Tympanic membrane, external ear and ear canal normal.   Nose: No mucosal edema, rhinorrhea or nasal deformity. No epistaxis.   Mouth/Throat: Oropharynx is clear and moist and mucous membranes are normal. No oral lesions. No uvula swelling. No oropharyngeal exudate, posterior oropharyngeal edema or posterior oropharyngeal erythema.   Neck: Phonation normal. Neck supple. No tracheal deviation present.   Pulmonary/Chest: Effort normal. No respiratory distress.   Lymphadenopathy:     She has no cervical adenopathy.   Neurological: She is alert and oriented to person, place, and time. She displays no weakness.   Skin: Skin is warm and dry.   Psychiatric: She has a normal mood and affect. Her behavior is normal. Her speech is not slurred.       Tests / Results:                  Assessment:       1. Hearing difficulty of both ears    2. Tinnitus of both ears    3. Asymmetrical sensorineural hearing loss        Plan:        Reviewed above and audiogram and considerations and answered questions.  Will proceed with MRI of brain and IAC's without and with contrast after creatinine level.  Follow-up results and recheck in 2 weeks.  Hearing protection as reviewed.

## 2019-10-05 DIAGNOSIS — F32.A DEPRESSION, UNSPECIFIED DEPRESSION TYPE: ICD-10-CM

## 2019-10-06 RX ORDER — DESVENLAFAXINE 100 MG/1
TABLET, EXTENDED RELEASE ORAL
Qty: 90 TABLET | Refills: 3 | Status: SHIPPED | OUTPATIENT
Start: 2019-10-06 | End: 2020-10-08 | Stop reason: SDUPTHER

## 2019-10-14 NOTE — PROGRESS NOTES
Malinda Good, CCC-A  Audiologist - [facility] [address] [place] [address]  [email or web address]  [phone]    Patient: Dora Correa   MRN: [id number]  [address] [facility]   Home Phone [phone]   Work Phone Not on file.   Mobile [phone]   : 1955  OCHOA: 2019      AUDIOLOGICAL EVALUATION      RECOMMENDATIONS:   It is recommended that she:   Follow up medically with Dr. Bustos to assess asymmetrical hearing loss and bilateral tinnitus.   Continue to receive audiological monitoring annually.   Use precaution and/or hearing protection in noisy environments.    If you should have any questions or concerns regarding the above information, please do not hesitate to contact me at [phone].      _______________________________  Malinda Good, HARRIET-A  Audiologist

## 2019-10-27 NOTE — PROGRESS NOTES
Subjective:       Patient ID: Dora Correa is a 64 y.o. female.    Chief Complaint: Follow-up    Returns for results and follow-up.  After last visit had creatinine level and MRI done without difficulty.  Ear complaints about the same with no new complaints.        Review of Systems   Ears: Positive for hearing loss, ringing in ear and family history of hearing loss.  Negative for ear pain, ear discharge, ear infections, dizziness, head trauma and taken gentramycin/streptomycin.    Nose:  Negative for nasal obstruction, nasal or sinus surgery, postnasal drip and snoring.    Mouth/Throat: Negative for pain swallowing, impaired swallowing, hoarse voice, throat mass and neck mass.   Constitutional: Negative for recent unexplained weight loss and fever.    Eyes:  Negative for visual change.   Cardiovascular:  Negative for chest pain, palpitations and history of high blood pressure.   Respiratory:  Negative for asthma, emphysema, history of tuberculosis, recent cough and shortness of breath.    Gastrointestinal:  Negative for history of stomach ulcers or pain, acid reflux, indigestion and blood in stool.   Other:  Positive for arthritis. Negative for kidney problem, bladder problem, weakness, disturbances in coordination, slurred, swollen glands, anemia and persistent infections.           Objective:        Vitals:    09/11/19 1002   BP: 118/83   Pulse: 79   Temp: 97.5 °F (36.4 °C)     Body mass index is 29.8 kg/m².  Physical Exam   Constitutional: She is oriented to person, place, and time. She appears well-developed and well-nourished. No distress.   HENT:   Head: Normocephalic and atraumatic.   Right Ear: Tympanic membrane, external ear and ear canal normal.   Left Ear: Tympanic membrane, external ear and ear canal normal.   Nose: No mucosal edema, rhinorrhea or nasal deformity. No epistaxis.   Mouth/Throat: Oropharynx is clear and moist and mucous membranes are normal. No oral lesions. No uvula swelling. No  oropharyngeal exudate, posterior oropharyngeal edema or posterior oropharyngeal erythema.   Neck: Phonation normal. Neck supple. No tracheal deviation present.   Pulmonary/Chest: Effort normal. No respiratory distress.   Lymphadenopathy:     She has no cervical adenopathy.   Neurological: She is alert and oriented to person, place, and time. She displays no weakness.   Skin: Skin is warm and dry.   Psychiatric: She has a normal mood and affect. Her behavior is normal. Her speech is not slurred.       Tests / Results:    MRI of brain and IAC's without and with contrast done since last visit here report reviewed with patient.        Assessment:       1. Sensorineural hearing loss, bilateral    2. History of exposure to noise    3. Abnormal acoustic reflex    4. Bilateral tinnitus        Plan:       Reviewed all above and considerations and recommendations and answered questions.  Hearing protection reviewed.  Consider custom ear molds.  Tinnitus discussed.  Recheck 1 year with audiogram unless change or problems prior.  Follow-up MRI questions with PCP.

## 2019-10-29 ENCOUNTER — PATIENT OUTREACH (OUTPATIENT)
Dept: ADMINISTRATIVE | Facility: HOSPITAL | Age: 64
End: 2019-10-29

## 2019-12-26 NOTE — PROGRESS NOTES
Subjective:       Patient ID: Dora Correa is a 63 y.o. female.    Chief Complaint: Annual Exam    Here for annual visit    Having cosmetic plastic surgery. Pt denies CP at rest or with exertion, SOB, episodic chest tightness and wheezing, chronic cough, palpitations, dizziness, orthopnea, PND, LE edema, personal or family hx of adverse reactions to anesthesia, family Hx of sudden cardiac death.     Has had wonderful reaction to pristiq. Mood is great.     Hx of PUD. Denies unexplained weight loss, dysphagia or sensation of things sticking in throat, BRBPR, melena, night sweats. Energy levels are good. She does not take her iron consistently. She has suffered from chronic constipation her entire life.     Hx of chronic OA. Improving with weight loss. Does not require daily meds. Does HEP often if not daily.    Her mother had pancreatic cancer along with thyroid and parathyroid and concerned for MEN but thyroid/oarathyroid felt due to XRT she had done for hemangioma she had as a child.         Review of Systems   Constitutional: Negative for chills, fatigue, fever and unexpected weight change.   HENT: Negative for ear pain, hearing loss, postnasal drip, tinnitus, trouble swallowing and voice change.    Respiratory: Negative for cough, chest tightness, shortness of breath and wheezing.    Cardiovascular: Negative for chest pain, palpitations and leg swelling.   Gastrointestinal: Negative for abdominal pain, blood in stool, diarrhea, nausea and vomiting.   Endocrine: Negative for polydipsia, polyphagia and polyuria.   Genitourinary: Negative for difficulty urinating, dysuria, hematuria and vaginal bleeding.   Skin: Negative for rash.   Allergic/Immunologic: Negative for food allergies.   Neurological: Negative for dizziness, numbness and headaches.   Hematological: Does not bruise/bleed easily.   Psychiatric/Behavioral: The patient is not nervous/anxious.        Objective:      Vitals:    05/15/19 0807   BP:  "122/83   Pulse: 71   SpO2: 97%   Height: 5' 8" (1.727 m)      Physical Exam   Constitutional: She is oriented to person, place, and time. She appears well-developed and well-nourished. No distress.   HENT:   Head: Normocephalic and atraumatic.   Mouth/Throat: Oropharynx is clear and moist. No oropharyngeal exudate.   Eyes: Pupils are equal, round, and reactive to light. Conjunctivae and EOM are normal. No scleral icterus.   Neck: No thyromegaly present.   Cardiovascular: Normal rate, regular rhythm and normal heart sounds.   No murmur heard.  Pulmonary/Chest: Effort normal and breath sounds normal. She has no wheezes. She has no rales.   Abdominal: Soft. She exhibits no distension. There is no tenderness.   Musculoskeletal: She exhibits no edema or tenderness.   Lymphadenopathy:     She has no cervical adenopathy.   Neurological: She is alert and oriented to person, place, and time.   Skin: Skin is warm and dry.   Psychiatric: She has a normal mood and affect. Her behavior is normal.       Assessment:       1. Annual physical exam    2. Chronic pain of right knee    3. Depression, unspecified depression type    4. History of gastric ulcer    5. Pre-op evaluation    6. Vaccine counseling    7. Enlarged thyroid    8. Colon cancer screening        Plan:       Dora was seen today for annual exam.    Diagnoses and all orders for this visit:    Annual physical exam  -     Lipid panel; Future  -     TSH; Future  -     Hemoglobin A1c; Future  -     CBC auto differential; Future  -     Comprehensive metabolic panel; Future    Chronic pain of right knee   Improving. Continue weight loss and exercise.  Depression, unspecified depression type   controlled. Continue current regimen    History of gastric ulcer  -     Ferritin; Future    Pre-op evaluation  -No signs or symptoms of underlying or uncontrolled cardiac or pulmonary pathology  -EKG done 5/15/19 without any acute or remote signs of ischemia or underlying arrhythmia "   -Patient has an estimated risk of perioperative myocardial infarction or cardiac arrest of approx 0.03 % using [facility] model. Capable of >4 METs.  -No additional testing or change in management is required prior to elective procedure with choice anesthesia.  -     APTT; Future  -     Protime-INR; Future  -     URINALYSIS  -     SCHEDULED EKG 12-LEAD (to Muse); Future  -     Urinalysis; Future    Vaccine counseling  -     RUBEOLA ANTIBODY IGG; Future  -     RUBEOLA ANTIBODY IGG; Future    Enlarged thyroid  -     US Soft Tissue Head Neck Thyroid; Future    Colon cancer screening  -     Fecal Immunochemical Test (iFOBT); Future                 Side effects of medication(s) were discussed in detail and patient voiced understanding.  Patient will call back for any issues or complications.    Ftsg Text: The defect edges were debeveled with a #15 scalpel blade.  Given the location of the defect, shape of the defect and the proximity to free margins a full thickness skin graft was deemed most appropriate.  Using a sterile surgical marker, the primary defect shape was transferred to the donor site. The area thus outlined was incised deep to adipose tissue with a #15 scalpel blade.  The harvested graft was then trimmed of adipose tissue until only dermis and epidermis was left.  The skin margins of the secondary defect were undermined to an appropriate distance in all directions utilizing iris scissors.  The secondary defect was closed with interrupted buried subcutaneous sutures.  The skin edges were then re-apposed with running  sutures.  The skin graft was then placed in the primary defect and oriented appropriately.

## 2020-02-24 ENCOUNTER — PATIENT OUTREACH (OUTPATIENT)
Dept: ADMINISTRATIVE | Facility: HOSPITAL | Age: 65
End: 2020-02-24

## 2020-03-19 NOTE — PROGRESS NOTES
Called patient to explain that due to recent events regarding the CODIV 19 outbreak, we will be cancelling the upcoming stress test in order to limit patient exposure. If patient has any symptoms or questions in the meantime they should call the office. Otherwise someone from the office will contact them at a later date to reschedule the test. Patient verbalized understanding.     Spoke with Dr. Del Toro's nurse - santo to defer.    Pt left unit via stretcher with transport. Heart monitor in place. NAD noted.

## 2020-05-29 DIAGNOSIS — Z12.11 COLON CANCER SCREENING: ICD-10-CM

## 2020-06-05 ENCOUNTER — LAB VISIT (OUTPATIENT)
Dept: INTERNAL MEDICINE | Facility: CLINIC | Age: 65
End: 2020-06-05
Payer: COMMERCIAL

## 2020-06-05 DIAGNOSIS — Z20.822 SUSPECTED COVID-19 VIRUS INFECTION: ICD-10-CM

## 2020-06-05 LAB — SARS-COV-2 RNA RESP QL NAA+PROBE: NOT DETECTED

## 2020-06-05 PROCEDURE — U0003 INFECTIOUS AGENT DETECTION BY NUCLEIC ACID (DNA OR RNA); SEVERE ACUTE RESPIRATORY SYNDROME CORONAVIRUS 2 (SARS-COV-2) (CORONAVIRUS DISEASE [COVID-19]), AMPLIFIED PROBE TECHNIQUE, MAKING USE OF HIGH THROUGHPUT TECHNOLOGIES AS DESCRIBED BY CMS-2020-01-R: HCPCS

## 2020-09-17 ENCOUNTER — PATIENT MESSAGE (OUTPATIENT)
Dept: ADMINISTRATIVE | Facility: HOSPITAL | Age: 65
End: 2020-09-17

## 2020-09-22 ENCOUNTER — TELEPHONE (OUTPATIENT)
Dept: ADMINISTRATIVE | Facility: OTHER | Age: 65
End: 2020-09-22

## 2020-09-25 ENCOUNTER — TELEPHONE (OUTPATIENT)
Dept: PRIMARY CARE CLINIC | Facility: CLINIC | Age: 65
End: 2020-09-25

## 2020-10-07 ENCOUNTER — PATIENT MESSAGE (OUTPATIENT)
Dept: INTERNAL MEDICINE | Facility: CLINIC | Age: 65
End: 2020-10-07

## 2020-10-07 DIAGNOSIS — F32.A DEPRESSION, UNSPECIFIED DEPRESSION TYPE: ICD-10-CM

## 2020-10-08 RX ORDER — DESVENLAFAXINE 100 MG/1
100 TABLET, EXTENDED RELEASE ORAL DAILY
Qty: 90 TABLET | Refills: 0 | Status: SHIPPED | OUTPATIENT
Start: 2020-10-08 | End: 2020-12-09 | Stop reason: SDUPTHER

## 2020-11-12 ENCOUNTER — PATIENT MESSAGE (OUTPATIENT)
Dept: INTERNAL MEDICINE | Facility: CLINIC | Age: 65
End: 2020-11-12

## 2020-11-12 ENCOUNTER — OFFICE VISIT (OUTPATIENT)
Dept: INTERNAL MEDICINE | Facility: CLINIC | Age: 65
End: 2020-11-12
Attending: INTERNAL MEDICINE
Payer: COMMERCIAL

## 2020-11-12 ENCOUNTER — LAB VISIT (OUTPATIENT)
Dept: LAB | Facility: OTHER | Age: 65
End: 2020-11-12
Attending: INTERNAL MEDICINE
Payer: COMMERCIAL

## 2020-11-12 ENCOUNTER — LAB VISIT (OUTPATIENT)
Dept: LAB | Facility: HOSPITAL | Age: 65
End: 2020-11-12
Attending: INTERNAL MEDICINE
Payer: COMMERCIAL

## 2020-11-12 VITALS
HEART RATE: 79 BPM | DIASTOLIC BLOOD PRESSURE: 76 MMHG | SYSTOLIC BLOOD PRESSURE: 106 MMHG | HEIGHT: 68 IN | OXYGEN SATURATION: 99 % | BODY MASS INDEX: 30.5 KG/M2 | WEIGHT: 201.25 LBS

## 2020-11-12 DIAGNOSIS — F32.A DEPRESSION, UNSPECIFIED DEPRESSION TYPE: ICD-10-CM

## 2020-11-12 DIAGNOSIS — Z00.00 ANNUAL PHYSICAL EXAM: ICD-10-CM

## 2020-11-12 DIAGNOSIS — F32.A DEPRESSION, UNSPECIFIED DEPRESSION TYPE: Primary | ICD-10-CM

## 2020-11-12 DIAGNOSIS — E04.1 THYROID NODULE: ICD-10-CM

## 2020-11-12 DIAGNOSIS — R93.89 ABNORMAL THYROID ULTRASOUND: ICD-10-CM

## 2020-11-12 DIAGNOSIS — Z12.11 COLON CANCER SCREENING: ICD-10-CM

## 2020-11-12 DIAGNOSIS — Z11.4 ENCOUNTER FOR SCREENING FOR HIV: ICD-10-CM

## 2020-11-12 DIAGNOSIS — Z00.00 ANNUAL PHYSICAL EXAM: Primary | ICD-10-CM

## 2020-11-12 DIAGNOSIS — Z87.11 HISTORY OF GASTRIC ULCER: ICD-10-CM

## 2020-11-12 LAB
ALBUMIN SERPL BCP-MCNC: 4.4 G/DL (ref 3.5–5.2)
ALP SERPL-CCNC: 99 U/L (ref 55–135)
ALT SERPL W/O P-5'-P-CCNC: 18 U/L (ref 10–44)
ANION GAP SERPL CALC-SCNC: 11 MMOL/L (ref 8–16)
AST SERPL-CCNC: 18 U/L (ref 10–40)
BASOPHILS # BLD AUTO: 0.09 K/UL (ref 0–0.2)
BASOPHILS NFR BLD: 1.4 % (ref 0–1.9)
BILIRUB SERPL-MCNC: 0.4 MG/DL (ref 0.1–1)
BUN SERPL-MCNC: 12 MG/DL (ref 8–23)
CALCIUM SERPL-MCNC: 10.4 MG/DL (ref 8.7–10.5)
CHLORIDE SERPL-SCNC: 103 MMOL/L (ref 95–110)
CHOLEST SERPL-MCNC: 271 MG/DL (ref 120–199)
CHOLEST/HDLC SERPL: 3.6 {RATIO} (ref 2–5)
CO2 SERPL-SCNC: 26 MMOL/L (ref 23–29)
CREAT SERPL-MCNC: 0.7 MG/DL (ref 0.5–1.4)
DIFFERENTIAL METHOD: ABNORMAL
EOSINOPHIL # BLD AUTO: 0.3 K/UL (ref 0–0.5)
EOSINOPHIL NFR BLD: 5 % (ref 0–8)
ERYTHROCYTE [DISTWIDTH] IN BLOOD BY AUTOMATED COUNT: 13 % (ref 11.5–14.5)
EST. GFR  (AFRICAN AMERICAN): >60 ML/MIN/1.73 M^2
EST. GFR  (NON AFRICAN AMERICAN): >60 ML/MIN/1.73 M^2
ESTIMATED AVG GLUCOSE: 88 MG/DL (ref 68–131)
FERRITIN SERPL-MCNC: 20 NG/ML (ref 20–300)
GLUCOSE SERPL-MCNC: 74 MG/DL (ref 70–110)
HBA1C MFR BLD HPLC: 4.7 % (ref 4–5.6)
HCT VFR BLD AUTO: 43.2 % (ref 37–48.5)
HDLC SERPL-MCNC: 76 MG/DL (ref 40–75)
HDLC SERPL: 28 % (ref 20–50)
HGB BLD-MCNC: 13.8 G/DL (ref 12–16)
IMM GRANULOCYTES # BLD AUTO: 0.02 K/UL (ref 0–0.04)
IMM GRANULOCYTES NFR BLD AUTO: 0.3 % (ref 0–0.5)
IRON SERPL-MCNC: 94 UG/DL (ref 30–160)
LDLC SERPL CALC-MCNC: 163.8 MG/DL (ref 63–159)
LYMPHOCYTES # BLD AUTO: 2.1 K/UL (ref 1–4.8)
LYMPHOCYTES NFR BLD: 31.7 % (ref 18–48)
MCH RBC QN AUTO: 31.2 PG (ref 27–31)
MCHC RBC AUTO-ENTMCNC: 31.9 G/DL (ref 32–36)
MCV RBC AUTO: 98 FL (ref 82–98)
MONOCYTES # BLD AUTO: 0.5 K/UL (ref 0.3–1)
MONOCYTES NFR BLD: 7 % (ref 4–15)
NEUTROPHILS # BLD AUTO: 3.5 K/UL (ref 1.8–7.7)
NEUTROPHILS NFR BLD: 54.6 % (ref 38–73)
NONHDLC SERPL-MCNC: 195 MG/DL
NRBC BLD-RTO: 0 /100 WBC
PLATELET # BLD AUTO: 352 K/UL (ref 150–350)
PMV BLD AUTO: 10.8 FL (ref 9.2–12.9)
POTASSIUM SERPL-SCNC: 4.9 MMOL/L (ref 3.5–5.1)
PROT SERPL-MCNC: 7.6 G/DL (ref 6–8.4)
RBC # BLD AUTO: 4.42 M/UL (ref 4–5.4)
SATURATED IRON: 18 % (ref 20–50)
SODIUM SERPL-SCNC: 140 MMOL/L (ref 136–145)
TOTAL IRON BINDING CAPACITY: 509 UG/DL (ref 250–450)
TRANSFERRIN SERPL-MCNC: 344 MG/DL (ref 200–375)
TRIGL SERPL-MCNC: 156 MG/DL (ref 30–150)
TSH SERPL DL<=0.005 MIU/L-ACNC: 3.27 UIU/ML (ref 0.4–4)
WBC # BLD AUTO: 6.46 K/UL (ref 3.9–12.7)

## 2020-11-12 PROCEDURE — 85025 COMPLETE CBC W/AUTO DIFF WBC: CPT

## 2020-11-12 PROCEDURE — 1126F AMNT PAIN NOTED NONE PRSNT: CPT | Mod: S$GLB,,, | Performed by: INTERNAL MEDICINE

## 2020-11-12 PROCEDURE — 84443 ASSAY THYROID STIM HORMONE: CPT

## 2020-11-12 PROCEDURE — 82274 ASSAY TEST FOR BLOOD FECAL: CPT

## 2020-11-12 PROCEDURE — 82728 ASSAY OF FERRITIN: CPT

## 2020-11-12 PROCEDURE — 80061 LIPID PANEL: CPT

## 2020-11-12 PROCEDURE — 99999 PR PBB SHADOW E&M-EST. PATIENT-LVL III: CPT | Mod: PBBFAC,,, | Performed by: INTERNAL MEDICINE

## 2020-11-12 PROCEDURE — 36415 COLL VENOUS BLD VENIPUNCTURE: CPT

## 2020-11-12 PROCEDURE — 83540 ASSAY OF IRON: CPT

## 2020-11-12 PROCEDURE — 80053 COMPREHEN METABOLIC PANEL: CPT

## 2020-11-12 PROCEDURE — 86703 HIV-1/HIV-2 1 RESULT ANTBDY: CPT

## 2020-11-12 PROCEDURE — 99999 PR PBB SHADOW E&M-EST. PATIENT-LVL III: ICD-10-PCS | Mod: PBBFAC,,, | Performed by: INTERNAL MEDICINE

## 2020-11-12 PROCEDURE — 99396 PREV VISIT EST AGE 40-64: CPT | Mod: S$GLB,,, | Performed by: INTERNAL MEDICINE

## 2020-11-12 PROCEDURE — 3008F PR BODY MASS INDEX (BMI) DOCUMENTED: ICD-10-PCS | Mod: CPTII,S$GLB,, | Performed by: INTERNAL MEDICINE

## 2020-11-12 PROCEDURE — 83036 HEMOGLOBIN GLYCOSYLATED A1C: CPT

## 2020-11-12 PROCEDURE — 3008F BODY MASS INDEX DOCD: CPT | Mod: CPTII,S$GLB,, | Performed by: INTERNAL MEDICINE

## 2020-11-12 PROCEDURE — 1126F PR PAIN SEVERITY QUANTIFIED, NO PAIN PRESENT: ICD-10-PCS | Mod: S$GLB,,, | Performed by: INTERNAL MEDICINE

## 2020-11-12 PROCEDURE — 99396 PR PREVENTIVE VISIT,EST,40-64: ICD-10-PCS | Mod: S$GLB,,, | Performed by: INTERNAL MEDICINE

## 2020-11-12 RX ORDER — DULOXETIN HYDROCHLORIDE 20 MG/1
20 CAPSULE, DELAYED RELEASE ORAL DAILY
Qty: 30 CAPSULE | Refills: 2 | OUTPATIENT
Start: 2020-11-12 | End: 2021-11-12

## 2020-11-12 NOTE — PROGRESS NOTES
"Subjective:       Patient ID: Dora Correa is a 65 y.o. female.    Chief Complaint: Annual Exam    Here for annual exam       Weight is up.  Has had wonderful reaction to pristiq after starting, over time feels it is less effecgive. Added old prozac to pristiq     ---Hx of PUD--   Denies unexplained weight loss, dysphagia or sensation of things sticking in throat, BRBPR, melena, night sweats. Energy levels are good. She does not take her iron consistently. She has suffered from chronic constipation her entire life.      Hx of chronic OA. Improving with weight loss. Does not require daily meds. Does HEP often if not daily.     Her mother had pancreatic cancer along with thyroid and parathyroid and concerned for MEN but thyroid/oarathyroid felt due to XRT she had done for hemangioma she had as a child.       Review of Systems   Constitutional: Negative for chills, fatigue, fever and unexpected weight change.   HENT: Negative for ear pain, hearing loss, postnasal drip, tinnitus, trouble swallowing and voice change.    Respiratory: Negative for cough, chest tightness, shortness of breath and wheezing.    Cardiovascular: Negative for chest pain, palpitations and leg swelling.   Gastrointestinal: Negative for abdominal pain, blood in stool, diarrhea, nausea and vomiting.   Endocrine: Negative for polydipsia, polyphagia and polyuria.   Genitourinary: Negative for difficulty urinating, dysuria, hematuria and vaginal bleeding.   Skin: Negative for rash.   Allergic/Immunologic: Negative for food allergies.   Neurological: Negative for dizziness, numbness and headaches.   Hematological: Does not bruise/bleed easily.   Psychiatric/Behavioral: The patient is not nervous/anxious.        Objective:      Vitals:    11/12/20 1011   BP: 106/76   Pulse: 79   SpO2: 99%   Weight: 91.3 kg (201 lb 4.5 oz)   Height: 5' 8" (1.727 m)      Physical Exam  Constitutional:       General: She is not in acute distress.     Appearance: She is " well-developed.   HENT:      Head: Normocephalic and atraumatic.      Mouth/Throat:      Pharynx: No oropharyngeal exudate.   Eyes:      General: No scleral icterus.     Conjunctiva/sclera: Conjunctivae normal.      Pupils: Pupils are equal, round, and reactive to light.   Neck:      Thyroid: No thyromegaly.   Cardiovascular:      Rate and Rhythm: Normal rate and regular rhythm.      Heart sounds: Normal heart sounds. No murmur.   Pulmonary:      Effort: Pulmonary effort is normal.      Breath sounds: Normal breath sounds. No wheezing or rales.   Abdominal:      General: There is no distension.      Palpations: Abdomen is soft.      Tenderness: There is no abdominal tenderness.   Musculoskeletal:         General: No tenderness.   Lymphadenopathy:      Cervical: No cervical adenopathy.   Skin:     General: Skin is warm and dry.   Neurological:      Mental Status: She is alert and oriented to person, place, and time.   Psychiatric:         Behavior: Behavior normal.         Assessment:       1. Annual physical exam    2. History of gastric ulcer    3. Abnormal thyroid ultrasound    4. Thyroid nodule    5. Encounter for screening for HIV    6. Colon cancer screening    7. Depression, unspecified depression type        Plan:       Dora was seen today for annual exam.    Diagnoses and all orders for this visit:    Annual physical exam  -     CBC Auto Differential; Future  -     TSH; Future  -     Lipid Panel; Future  -     Comprehensive Metabolic Panel; Future  -     Hemoglobin A1C; Future    History of gastric ulcer  -     Ferritin; Future  -     Iron and TIBC; Future    Abnormal thyroid ultrasound     Asymptomatic. Hormonal and mechanical. Update U.s     Thyroid nodule  -     US Soft Tissue Head Neck Thyroid; Future    Encounter for screening for HIV  -     HIV 1/2 Ag/Ab (4th Gen); Future    Colon cancer screening  -     Fecal Immunochemical Test (iFOBT); Future    Depression, unspecified depression type    we  discussed switching to cymbalta, but before we do that let's get rid of prozac and titrate up pristiq every 10 days by 100mg to 400mg and go from there.       Norris Willoughby MD  Internal Medicine-[facility]        Side effects of medication(s) were discussed in detail and patient voiced understanding.  Patient will call back for any issues or complications.

## 2020-11-13 ENCOUNTER — IMMUNIZATION (OUTPATIENT)
Dept: PHARMACY | Facility: CLINIC | Age: 65
End: 2020-11-13
Payer: COMMERCIAL

## 2020-11-13 ENCOUNTER — HOSPITAL ENCOUNTER (OUTPATIENT)
Dept: RADIOLOGY | Facility: HOSPITAL | Age: 65
Discharge: HOME | End: 2020-11-13
Attending: INTERNAL MEDICINE
Payer: COMMERCIAL

## 2020-11-13 DIAGNOSIS — E04.1 THYROID NODULE: ICD-10-CM

## 2020-11-13 LAB — HIV 1+2 AB+HIV1 P24 AG SERPL QL IA: NEGATIVE

## 2020-11-13 PROCEDURE — 76536 US EXAM OF HEAD AND NECK: CPT | Mod: TC

## 2020-11-13 PROCEDURE — 76536 US SOFT TISSUE HEAD NECK THYROID: ICD-10-PCS | Mod: 26,,, | Performed by: RADIOLOGY

## 2020-11-13 PROCEDURE — 76536 US EXAM OF HEAD AND NECK: CPT | Mod: 26,,, | Performed by: RADIOLOGY

## 2020-11-14 ENCOUNTER — PATIENT MESSAGE (OUTPATIENT)
Dept: INTERNAL MEDICINE | Facility: CLINIC | Age: 65
End: 2020-11-14

## 2020-11-16 ENCOUNTER — PATIENT MESSAGE (OUTPATIENT)
Dept: INTERNAL MEDICINE | Facility: CLINIC | Age: 65
End: 2020-11-16

## 2020-11-16 LAB — HEMOCCULT STL QL IA: NEGATIVE

## 2020-12-08 ENCOUNTER — PATIENT MESSAGE (OUTPATIENT)
Dept: INTERNAL MEDICINE | Facility: CLINIC | Age: 65
End: 2020-12-08

## 2020-12-08 DIAGNOSIS — F32.A DEPRESSION, UNSPECIFIED DEPRESSION TYPE: ICD-10-CM

## 2020-12-09 ENCOUNTER — PATIENT MESSAGE (OUTPATIENT)
Dept: INTERNAL MEDICINE | Facility: CLINIC | Age: 65
End: 2020-12-09

## 2020-12-09 DIAGNOSIS — F32.A DEPRESSION, UNSPECIFIED DEPRESSION TYPE: Primary | ICD-10-CM

## 2020-12-09 RX ORDER — DESVENLAFAXINE 100 MG/1
100 TABLET, EXTENDED RELEASE ORAL DAILY
Qty: 90 TABLET | Refills: 2 | Status: SHIPPED | OUTPATIENT
Start: 2020-12-09 | End: 2020-12-10 | Stop reason: SDUPTHER

## 2020-12-10 ENCOUNTER — PATIENT MESSAGE (OUTPATIENT)
Dept: INTERNAL MEDICINE | Facility: CLINIC | Age: 65
End: 2020-12-10

## 2020-12-10 DIAGNOSIS — F32.A DEPRESSION, UNSPECIFIED DEPRESSION TYPE: ICD-10-CM

## 2020-12-10 RX ORDER — DESVENLAFAXINE 100 MG/1
100 TABLET, EXTENDED RELEASE ORAL
Qty: 120 TABLET | Refills: 2 | OUTPATIENT
Start: 2020-12-10 | End: 2021-12-10

## 2020-12-10 RX ORDER — DESVENLAFAXINE 100 MG/1
100 TABLET, EXTENDED RELEASE ORAL 2 TIMES DAILY
Qty: 180 TABLET | Refills: 0 | Status: SHIPPED | OUTPATIENT
Start: 2020-12-10 | End: 2020-12-21

## 2020-12-16 ENCOUNTER — PATIENT MESSAGE (OUTPATIENT)
Dept: INTERNAL MEDICINE | Facility: CLINIC | Age: 65
End: 2020-12-16

## 2020-12-16 NOTE — TELEPHONE ENCOUNTER
Contacted [employer] and completed PA which was approved. Reference number is [id number]. Message sent to patient informing her of approval.

## 2020-12-21 ENCOUNTER — OFFICE VISIT (OUTPATIENT)
Dept: INTERNAL MEDICINE | Facility: CLINIC | Age: 65
End: 2020-12-21
Attending: INTERNAL MEDICINE
Payer: COMMERCIAL

## 2020-12-21 ENCOUNTER — PATIENT MESSAGE (OUTPATIENT)
Dept: INTERNAL MEDICINE | Facility: CLINIC | Age: 65
End: 2020-12-21

## 2020-12-21 DIAGNOSIS — F32.A DEPRESSION, UNSPECIFIED DEPRESSION TYPE: ICD-10-CM

## 2020-12-21 DIAGNOSIS — M25.561 RIGHT KNEE PAIN, UNSPECIFIED CHRONICITY: Primary | ICD-10-CM

## 2020-12-21 PROCEDURE — 99214 PR OFFICE/OUTPT VISIT, EST, LEVL IV, 30-39 MIN: ICD-10-PCS | Mod: 95,,, | Performed by: INTERNAL MEDICINE

## 2020-12-21 PROCEDURE — 99214 OFFICE O/P EST MOD 30 MIN: CPT | Mod: 95,,, | Performed by: INTERNAL MEDICINE

## 2020-12-21 RX ORDER — TRAMADOL HYDROCHLORIDE 50 MG/1
50 TABLET ORAL EVERY 6 HOURS
Qty: 30 TABLET | Refills: 0 | Status: SHIPPED | OUTPATIENT
Start: 2020-12-21 | End: 2021-02-19

## 2020-12-21 RX ORDER — DESVENLAFAXINE 100 MG/1
200 TABLET, EXTENDED RELEASE ORAL DAILY
Qty: 180 TABLET | Refills: 2 | Status: SHIPPED | OUTPATIENT
Start: 2020-12-21 | End: 2021-01-05 | Stop reason: SDUPTHER

## 2020-12-21 NOTE — PROGRESS NOTES
Subjective:       Patient ID: Dora Correa is a 65 y.o. female.    Chief Complaint: No chief complaint on file.    The patient location is: Home [place]   The chief complaint leading to consultation is:   Visit type: audiovisual  Total time spent with patient:   Minutes  visit performed on virtual visit due to current risk associated with COVID 19 pandemic  Each patient to whom he or she provides medical services by telemedicine is:  (1) informed of the relationship between the physician and patient and the respective role of any other health care provider with respect to management of the patient; and (2) notified that he or she may decline to receive medical services by telemedicine and may withdraw from such care at any time.    ---MDD---  Titrated pristiq 50, to 100mg to 200mg over 2 months time with significant improvement in her symptoms.   Pristiq 200mg very effective for her mood and depression and symptoms of anxiety.  Prozac for total of several years at max dose and became less effective.   Wellbutrin made her feel uncomfortable and out of body without positive affects. Took for minimum of 3 months  Cymbalta for several months but not effective.    Worsening knee pain making her want to take NSAIDS. Hx of bleeding PUD requiring pRBCs. Would like to see orthopedist. Has been a few years.               Review of Systems   Constitutional: Negative for activity change and unexpected weight change.   HENT: Negative for hearing loss, rhinorrhea and trouble swallowing.    Eyes: Negative for discharge and visual disturbance.   Respiratory: Negative for chest tightness and wheezing.    Cardiovascular: Negative for chest pain and palpitations.   Gastrointestinal: Negative for blood in stool, constipation, diarrhea and vomiting.   Endocrine: Negative for polydipsia and polyuria.   Genitourinary: Negative for difficulty urinating, dysuria, hematuria and menstrual problem.   Musculoskeletal: Positive for  arthralgias. Negative for joint swelling and neck pain.   Neurological: Negative for weakness and headaches.   Psychiatric/Behavioral: Negative for confusion and dysphoric mood.       Objective:      There were no vitals filed for this visit.   Physical Exam  Constitutional:       General: She is not in acute distress.     Appearance: Normal appearance. She is well-developed. She is not diaphoretic.   HENT:      Head: Atraumatic.   Eyes:      General: No scleral icterus.        Right eye: No discharge.         Left eye: No discharge.      Conjunctiva/sclera: Conjunctivae normal.   Neck:      Thyroid: No thyromegaly.   Pulmonary:      Effort: Pulmonary effort is normal. No tachypnea, accessory muscle usage or respiratory distress.      Breath sounds: Normal breath sounds.   Skin:     Coloration: Skin is not pale.   Neurological:      Mental Status: She is alert and oriented to person, place, and time.   Psychiatric:         Speech: Speech normal.         Behavior: Behavior normal.         Assessment:       1. Right knee pain, unspecified chronicity    2. Depression, unspecified depression type        Plan:       Diagnoses and all orders for this visit:    Right knee pain, unspecified chronicity  -     Ambulatory referral/consult to Orthopedics; Future  -     traMADoL (ULTRAM) 50 mg tablet; Take 1 tablet (50 mg total) by mouth every 6 (six) hours.    Depression, unspecified depression type  -     desvenlafaxine succinate (PRISTIQ) 100 MG Tb24; Take 2 tablets (200 mg total) by mouth once daily.                 Norris Willoughby MD  Internal Medicine-[facility]        Side effects of medication(s) were discussed in detail and patient voiced understanding.  Patient will call back for any issues or complications.

## 2020-12-22 DIAGNOSIS — M25.562 LEFT KNEE PAIN, UNSPECIFIED CHRONICITY: Primary | ICD-10-CM

## 2020-12-27 ENCOUNTER — PATIENT MESSAGE (OUTPATIENT)
Dept: ORTHOPEDICS | Facility: CLINIC | Age: 65
End: 2020-12-27

## 2020-12-29 ENCOUNTER — OFFICE VISIT (OUTPATIENT)
Dept: ORTHOPEDICS | Facility: CLINIC | Age: 65
End: 2020-12-29
Payer: COMMERCIAL

## 2020-12-29 ENCOUNTER — HOSPITAL ENCOUNTER (OUTPATIENT)
Dept: RADIOLOGY | Facility: HOSPITAL | Age: 65
Discharge: HOME | End: 2020-12-29
Attending: ORTHOPAEDIC SURGERY
Payer: COMMERCIAL

## 2020-12-29 ENCOUNTER — PATIENT MESSAGE (OUTPATIENT)
Dept: INTERNAL MEDICINE | Facility: CLINIC | Age: 65
End: 2020-12-29

## 2020-12-29 VITALS — BODY MASS INDEX: 31.07 KG/M2 | WEIGHT: 205 LBS | HEIGHT: 68 IN

## 2020-12-29 DIAGNOSIS — M76.52 PATELLAR TENDONITIS OF LEFT KNEE: ICD-10-CM

## 2020-12-29 DIAGNOSIS — M25.562 LEFT KNEE PAIN, UNSPECIFIED CHRONICITY: ICD-10-CM

## 2020-12-29 PROCEDURE — 3008F PR BODY MASS INDEX (BMI) DOCUMENTED: ICD-10-PCS | Mod: CPTII,S$GLB,, | Performed by: ORTHOPAEDIC SURGERY

## 2020-12-29 PROCEDURE — 1125F PR PAIN SEVERITY QUANTIFIED, PAIN PRESENT: ICD-10-PCS | Mod: S$GLB,,, | Performed by: ORTHOPAEDIC SURGERY

## 2020-12-29 PROCEDURE — 73564 XR KNEE ORTHO LEFT WITH FLEXION: ICD-10-PCS | Mod: 26,LT,, | Performed by: RADIOLOGY

## 2020-12-29 PROCEDURE — 99999 PR PBB SHADOW E&M-EST. PATIENT-LVL III: CPT | Mod: PBBFAC,,, | Performed by: ORTHOPAEDIC SURGERY

## 2020-12-29 PROCEDURE — 73562 X-RAY EXAM OF KNEE 3: CPT | Mod: 26,RT,, | Performed by: RADIOLOGY

## 2020-12-29 PROCEDURE — 73564 X-RAY EXAM KNEE 4 OR MORE: CPT | Mod: 26,LT,, | Performed by: RADIOLOGY

## 2020-12-29 PROCEDURE — 99203 PR OFFICE/OUTPT VISIT, NEW, LEVL III, 30-44 MIN: ICD-10-PCS | Mod: S$GLB,,, | Performed by: ORTHOPAEDIC SURGERY

## 2020-12-29 PROCEDURE — 99999 PR PBB SHADOW E&M-EST. PATIENT-LVL III: ICD-10-PCS | Mod: PBBFAC,,, | Performed by: ORTHOPAEDIC SURGERY

## 2020-12-29 PROCEDURE — 3008F BODY MASS INDEX DOCD: CPT | Mod: CPTII,S$GLB,, | Performed by: ORTHOPAEDIC SURGERY

## 2020-12-29 PROCEDURE — 73562 XR KNEE ORTHO LEFT WITH FLEXION: ICD-10-PCS | Mod: 26,RT,, | Performed by: RADIOLOGY

## 2020-12-29 PROCEDURE — 73562 X-RAY EXAM OF KNEE 3: CPT | Mod: TC,RT

## 2020-12-29 PROCEDURE — 99203 OFFICE O/P NEW LOW 30 MIN: CPT | Mod: S$GLB,,, | Performed by: ORTHOPAEDIC SURGERY

## 2020-12-29 PROCEDURE — 1125F AMNT PAIN NOTED PAIN PRSNT: CPT | Mod: S$GLB,,, | Performed by: ORTHOPAEDIC SURGERY

## 2020-12-29 RX ORDER — MONTELUKAST SODIUM 10 MG/1
TABLET ORAL
COMMUNITY
Start: 2020-10-31

## 2020-12-29 RX ORDER — METFORMIN HYDROCHLORIDE 500 MG/1
500 TABLET ORAL 2 TIMES DAILY
COMMUNITY
Start: 2020-12-26

## 2020-12-29 NOTE — PROGRESS NOTES
"Subjective:     HPI:   Dora Correa is a 65 y.o. female who presents for evaluation of left knee pain referred Dr. Farrar after virtual visit 20    She has had months to years of intermittent left knee pain moderate to severe nature activity related and relieved with rest.  It is mostly left-sided and she points to her anterior knee at the proximal patellar tendon she says she has some occasional stabbing symptoms these are worse when she walks several miles get better with rest.  She is having some mild right knee symptoms as well    She says if she takes NSAIDs the pain goes away but she took so many that she gave herself gastric ulcer and had to get blood so she now avoids all NSAIDs    Medications: Rx'd tramadol 21 by Dr Farrar, nsaids relieve pain but avoids due to GI bleed history    Injections: ?CSI injection in [place] 5+ years ago    Physical Therapy: none    Walkin+ miles a day, "I can do deep knee bends no problem"    Assistive Devices: has tried patellar strap which helped in past but not now    Limitations: walking "I have to walk"      Occupation: retired    Social support:      ROS:  The updated medical history is in the chart and has been reviewed. A review of systems is updated and there is no reported vision changes, ear/nose/mouth/throat complaints,  chest pain, shortness of breath, abdominal pain, urological complaints, fevers or chills, psychiatric complaints. Musculoskeletal and neurologcial symptoms are as documented. All other systems are negative.      Objective:   Exam:  There were no vitals filed for this visit.  Body mass index is 31.17 kg/m².    Physical examination included assessment of the patient's general appearance with particular attention to development, nutrition, body habitus, attention to grooming, and any evidence of distress.  Constitutional: The patient is a well-developed, well-nourished patient in no acute distress.   Cardiovascular: " Vascular examination included warmth and capillary refill as well inspection for edema and assessment of pedal pulses. Pulses are palpable and regular.  Musculoskeletal: Gait was assessed as to whether it was steady, non-antalgic, and/or required the use of an assist device. The patient was also asked to walk independently and get onto the examination table.  Skin: The skin was examined for any obvious rashes or lesions in the extremity.  Neurologic: Sensation is intact to light touch in the extremity. The patient has good coordination without hyperreflexia and is alert and oriented to person, place and time and has normal mood and affect.     All of the above were examined and found to be within normal limits except for the following pertinent clinical findings:    Slight limp slight antalgic gait favoring the left knee.  0-140 degree knee range of motion 4° valgus alignment knee stable anterior-posterior varus and valgus stresses no flexion contracture or extensor lag.  No effusion.  She is tender palpation of the proximal patellar tendon at its origin connection to the patella she has a negative Maria Alejandra sign nontender palpation mediolateral patellofemoral joint lines      Imaging:  She has moderate joint space narrowing of the medial compartment no significant osteophyte formation on the left knee.  She has moderate joint space narrowing on the right knee with osteophyte formation at the medial tibia    Consistent with right more significant than left grade 2-3/4 OA      Assessment:       ICD-10-CM ICD-9-CM   1. Patellar tendonitis of left knee  M76.52 726.64      She does have right greater than left medial compartment varus arthritic changes I think this is minimally symptomatic I think her main pain generator is some proximal patellar tendinitis    History of gastric ulcer related to NSAID use requiring red blood cell transfusion    Patient is not a diabetic but she has a friend who is a primary care doctor  who is prescribing her some metformin for being overweight in today minimize inflammation I have told her to discuss this indication for metformin use with her actual primary care doctor     Plan:       We discussed natural history pathophysiology treatment options for patellar tendinitis gave her handout    We discussed that she does have some underlying arthritis in her knees but seems minimally symptomatic today.    I explained the potentially adverse gastric, cardiac, and renal effects of NSAIDs and explained that if the patient wishes to take it for longer that the patient should discuss this with their primary care physician to determine if it is safe to do so.    Recommend Voltaren gel and Tylenol to avoid oral NSAID use.  Will start a course of formal physical therapy, she should use ice and heat as needed.  She could continued use of a patellar strap as needed    We discussed that tendinitis is an overuse injury she needs to rest and let it heal her continued walking or at least 5 miles a day will continue to make her knee hurt no matter what we do    I do not think an MRI or surgery is indicated at this time.    Patient can follow up with Sports Medicine team for continued tendinitis issues.  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       Past Surgical History:   Procedure Laterality Date    KIDNEY STONE SURGERY Left 1981       Family History   Problem Relation Age of Onset    Leukemia Mother     Pancreatic cancer Mother      Leukemia Father     Coronary artery disease Father     Hyperlipidemia Brother     Coronary artery disease Maternal Grandfather     Melanoma Neg Hx        Social History     Socioeconomic History    Marital status:      Spouse name: Not on file    Number of children: Not on file    Years of education: Not on file    Highest education level: Not on file   Occupational History    Not on file   Social Needs    Financial resource strain: Not on file    Food insecurity     Worry: Not on file     Inability: Not on file    Transportation needs     Medical: Not on file     Non-medical: Not on file   Tobacco Use    Smoking status: Former Smoker     Types: Cigarettes     Quit date: 1986     Years since quittin.8    Smokeless tobacco: Never Used   Substance and Sexual Activity    Alcohol use: Yes     Comment: weekly    Drug use: No    Sexual activity: Yes     Partners: Female   Lifestyle    Physical activity     Days per week: Not on file     Minutes per session: Not on file    Stress: Not on file   Relationships    Social connections     Talks on phone: Not on file     Gets together: Not on file     Attends Orthodoxy service: Not on file     Active member of club or organization: Not on file     Attends meetings of clubs or organizations: Not on file     Relationship status: Not on file   Other Topics Concern    Are you pregnant or think you may be? Not Asked    Breast-feeding Not Asked   Social History Narrative    Not on file

## 2020-12-29 NOTE — LETTER
December 29, 2020      Norris Farrar MD  [address]  [address]  [facility] [address]           [facility] 5th Fl  [address]  [facility] [address]  Phone: [phone]          Patient: Dora Correa   MR Number: [id number]   YOB: 1955   Date of Visit: 12/29/2020       Dear Dr. Norris Farrar:    Thank you for referring Dora Correa to me for evaluation. Attached you will find relevant portions of my assessment and plan of care.    If you have questions, please do not hesitate to call me. I look forward to following Dora Correa along with you.    Sincerely,    Antione Arredondo III, MD    Enclosure  CC:  No Recipients    If you would like to receive this communication electronically, please contact [email or web address] or [phone] to request more information on [employer] access.    For providers and/or their staff who would like to refer a patient to [facility], please contact us through our one-stop-shop provider referral line, [facility], at [phone].    If you feel you have received this communication in error or would no longer like to receive these types of communications, please e-mail [email or web address]

## 2021-01-05 ENCOUNTER — PATIENT MESSAGE (OUTPATIENT)
Dept: INTERNAL MEDICINE | Facility: CLINIC | Age: 66
End: 2021-01-05

## 2021-01-05 DIAGNOSIS — F32.A DEPRESSION, UNSPECIFIED DEPRESSION TYPE: ICD-10-CM

## 2021-01-05 RX ORDER — DESVENLAFAXINE 100 MG/1
200 TABLET, EXTENDED RELEASE ORAL DAILY
Qty: 180 TABLET | Refills: 2 | Status: SHIPPED | OUTPATIENT
Start: 2021-01-05 | End: 2021-01-07

## 2021-01-06 ENCOUNTER — PATIENT MESSAGE (OUTPATIENT)
Dept: INTERNAL MEDICINE | Facility: CLINIC | Age: 66
End: 2021-01-06

## 2021-01-06 ENCOUNTER — CLINICAL SUPPORT (OUTPATIENT)
Dept: REHABILITATION | Facility: OTHER | Age: 66
End: 2021-01-06
Payer: COMMERCIAL

## 2021-01-06 DIAGNOSIS — M76.52 PATELLAR TENDONITIS OF LEFT KNEE: ICD-10-CM

## 2021-01-06 DIAGNOSIS — M62.81 MUSCLE WEAKNESS OF LOWER EXTREMITY: ICD-10-CM

## 2021-01-06 DIAGNOSIS — R26.89 GAIT, ANTALGIC: ICD-10-CM

## 2021-01-06 DIAGNOSIS — E04.1 THYROID NODULE: Primary | ICD-10-CM

## 2021-01-06 PROCEDURE — 97161 PT EVAL LOW COMPLEX 20 MIN: CPT | Mod: PN

## 2021-01-07 DIAGNOSIS — Z12.31 OTHER SCREENING MAMMOGRAM: ICD-10-CM

## 2021-01-07 RX ORDER — DULOXETIN HYDROCHLORIDE 20 MG/1
20 CAPSULE, DELAYED RELEASE ORAL DAILY
Qty: 90 CAPSULE | Refills: 2 | Status: CANCELLED | OUTPATIENT
Start: 2021-01-07 | End: 2022-01-07

## 2021-01-07 RX ORDER — DULOXETIN HYDROCHLORIDE 30 MG/1
30 CAPSULE, DELAYED RELEASE ORAL DAILY
Qty: 90 CAPSULE | Refills: 0 | Status: SHIPPED | OUTPATIENT
Start: 2021-01-07 | End: 2021-02-19 | Stop reason: SDUPTHER

## 2021-01-14 ENCOUNTER — CLINICAL SUPPORT (OUTPATIENT)
Dept: REHABILITATION | Facility: OTHER | Age: 66
End: 2021-01-14
Payer: COMMERCIAL

## 2021-01-14 DIAGNOSIS — M62.81 MUSCLE WEAKNESS OF LOWER EXTREMITY: ICD-10-CM

## 2021-01-14 DIAGNOSIS — R26.89 GAIT, ANTALGIC: ICD-10-CM

## 2021-01-14 PROCEDURE — 97110 THERAPEUTIC EXERCISES: CPT | Mod: PN,CQ

## 2021-01-14 PROCEDURE — 97140 MANUAL THERAPY 1/> REGIONS: CPT | Mod: PN,CQ

## 2021-01-17 ENCOUNTER — PATIENT MESSAGE (OUTPATIENT)
Dept: REHABILITATION | Facility: OTHER | Age: 66
End: 2021-01-17

## 2021-01-28 ENCOUNTER — CLINICAL SUPPORT (OUTPATIENT)
Dept: REHABILITATION | Facility: OTHER | Age: 66
End: 2021-01-28
Payer: COMMERCIAL

## 2021-01-28 DIAGNOSIS — R26.89 GAIT, ANTALGIC: ICD-10-CM

## 2021-01-28 DIAGNOSIS — G89.29 CHRONIC PAIN OF RIGHT KNEE: ICD-10-CM

## 2021-01-28 DIAGNOSIS — M25.561 CHRONIC PAIN OF RIGHT KNEE: ICD-10-CM

## 2021-01-28 DIAGNOSIS — M62.81 MUSCLE WEAKNESS OF LOWER EXTREMITY: Primary | ICD-10-CM

## 2021-01-28 PROCEDURE — 97530 THERAPEUTIC ACTIVITIES: CPT | Mod: PN

## 2021-01-28 PROCEDURE — 97110 THERAPEUTIC EXERCISES: CPT | Mod: PN

## 2021-01-28 PROCEDURE — 97140 MANUAL THERAPY 1/> REGIONS: CPT | Mod: PN

## 2021-01-29 ENCOUNTER — PATIENT MESSAGE (OUTPATIENT)
Dept: INTERNAL MEDICINE | Facility: CLINIC | Age: 66
End: 2021-01-29

## 2021-02-01 ENCOUNTER — CLINICAL SUPPORT (OUTPATIENT)
Dept: REHABILITATION | Facility: OTHER | Age: 66
End: 2021-02-01
Payer: COMMERCIAL

## 2021-02-01 DIAGNOSIS — R26.89 GAIT, ANTALGIC: ICD-10-CM

## 2021-02-01 DIAGNOSIS — M62.81 MUSCLE WEAKNESS OF LOWER EXTREMITY: ICD-10-CM

## 2021-02-01 PROCEDURE — 97110 THERAPEUTIC EXERCISES: CPT | Mod: PN,CQ

## 2021-02-04 ENCOUNTER — CLINICAL SUPPORT (OUTPATIENT)
Dept: REHABILITATION | Facility: OTHER | Age: 66
End: 2021-02-04
Payer: COMMERCIAL

## 2021-02-04 DIAGNOSIS — R26.89 GAIT, ANTALGIC: ICD-10-CM

## 2021-02-04 DIAGNOSIS — M62.81 MUSCLE WEAKNESS OF LOWER EXTREMITY: ICD-10-CM

## 2021-02-04 PROCEDURE — 97110 THERAPEUTIC EXERCISES: CPT | Mod: PN

## 2021-02-07 ENCOUNTER — PATIENT MESSAGE (OUTPATIENT)
Dept: INTERNAL MEDICINE | Facility: CLINIC | Age: 66
End: 2021-02-07

## 2021-02-08 ENCOUNTER — CLINICAL SUPPORT (OUTPATIENT)
Dept: REHABILITATION | Facility: OTHER | Age: 66
End: 2021-02-08
Payer: COMMERCIAL

## 2021-02-08 DIAGNOSIS — M62.81 MUSCLE WEAKNESS OF LOWER EXTREMITY: ICD-10-CM

## 2021-02-08 DIAGNOSIS — R26.89 GAIT, ANTALGIC: ICD-10-CM

## 2021-02-08 PROCEDURE — 97110 THERAPEUTIC EXERCISES: CPT | Mod: PN,CQ

## 2021-02-11 ENCOUNTER — CLINICAL SUPPORT (OUTPATIENT)
Dept: REHABILITATION | Facility: OTHER | Age: 66
End: 2021-02-11
Payer: COMMERCIAL

## 2021-02-11 DIAGNOSIS — M62.81 MUSCLE WEAKNESS OF LOWER EXTREMITY: ICD-10-CM

## 2021-02-11 DIAGNOSIS — R26.89 GAIT, ANTALGIC: ICD-10-CM

## 2021-02-11 PROCEDURE — 97110 THERAPEUTIC EXERCISES: CPT | Mod: PN

## 2021-02-19 ENCOUNTER — PATIENT MESSAGE (OUTPATIENT)
Dept: INTERNAL MEDICINE | Facility: CLINIC | Age: 66
End: 2021-02-19

## 2021-02-19 RX ORDER — DULOXETIN HYDROCHLORIDE 60 MG/1
60 CAPSULE, DELAYED RELEASE ORAL DAILY
Qty: 90 CAPSULE | Refills: 2 | Status: SHIPPED | OUTPATIENT
Start: 2021-02-19 | End: 2022-02-19

## 2021-02-22 ENCOUNTER — CLINICAL SUPPORT (OUTPATIENT)
Dept: REHABILITATION | Facility: OTHER | Age: 66
End: 2021-02-22
Payer: COMMERCIAL

## 2021-02-22 DIAGNOSIS — M62.81 MUSCLE WEAKNESS OF LOWER EXTREMITY: ICD-10-CM

## 2021-02-22 DIAGNOSIS — R26.89 GAIT, ANTALGIC: ICD-10-CM

## 2021-02-22 PROCEDURE — 97110 THERAPEUTIC EXERCISES: CPT | Mod: PN,CQ

## 2021-02-25 ENCOUNTER — PATIENT MESSAGE (OUTPATIENT)
Dept: REHABILITATION | Facility: OTHER | Age: 66
End: 2021-02-25

## 2021-02-25 ENCOUNTER — CLINICAL SUPPORT (OUTPATIENT)
Dept: REHABILITATION | Facility: OTHER | Age: 66
End: 2021-02-25
Payer: COMMERCIAL

## 2021-02-25 DIAGNOSIS — R26.89 GAIT, ANTALGIC: ICD-10-CM

## 2021-02-25 DIAGNOSIS — M62.81 MUSCLE WEAKNESS OF LOWER EXTREMITY: ICD-10-CM

## 2021-02-25 PROCEDURE — 97110 THERAPEUTIC EXERCISES: CPT | Mod: PN

## 2021-03-04 ENCOUNTER — CLINICAL SUPPORT (OUTPATIENT)
Dept: REHABILITATION | Facility: OTHER | Age: 66
End: 2021-03-04
Payer: COMMERCIAL

## 2021-03-04 DIAGNOSIS — M62.81 MUSCLE WEAKNESS OF LOWER EXTREMITY: ICD-10-CM

## 2021-03-04 DIAGNOSIS — R26.89 GAIT, ANTALGIC: ICD-10-CM

## 2021-03-04 PROCEDURE — 97110 THERAPEUTIC EXERCISES: CPT | Mod: PN,CQ

## 2021-04-05 ENCOUNTER — PATIENT MESSAGE (OUTPATIENT)
Dept: ADMINISTRATIVE | Facility: HOSPITAL | Age: 66
End: 2021-04-05

## 2021-04-08 ENCOUNTER — PATIENT MESSAGE (OUTPATIENT)
Dept: INTERNAL MEDICINE | Facility: CLINIC | Age: 66
End: 2021-04-08

## 2021-04-09 ENCOUNTER — PATIENT OUTREACH (OUTPATIENT)
Dept: ADMINISTRATIVE | Facility: HOSPITAL | Age: 66
End: 2021-04-09

## 2021-04-13 ENCOUNTER — PATIENT MESSAGE (OUTPATIENT)
Dept: OTOLARYNGOLOGY | Facility: CLINIC | Age: 66
End: 2021-04-13

## 2021-05-10 ENCOUNTER — PATIENT OUTREACH (OUTPATIENT)
Dept: ADMINISTRATIVE | Facility: OTHER | Age: 66
End: 2021-05-10

## 2021-05-10 DIAGNOSIS — H90.3 SENSORINEURAL HEARING LOSS, BILATERAL: Primary | ICD-10-CM

## 2021-05-10 DIAGNOSIS — H93.13 BILATERAL TINNITUS: ICD-10-CM

## 2021-05-10 DIAGNOSIS — Z77.122 HISTORY OF EXPOSURE TO NOISE: ICD-10-CM

## 2021-05-12 ENCOUNTER — TELEPHONE (OUTPATIENT)
Dept: INTERNAL MEDICINE | Facility: CLINIC | Age: 66
End: 2021-05-12

## 2021-05-12 ENCOUNTER — CLINICAL SUPPORT (OUTPATIENT)
Dept: OTOLARYNGOLOGY | Facility: CLINIC | Age: 66
End: 2021-05-12
Payer: COMMERCIAL

## 2021-05-12 ENCOUNTER — OFFICE VISIT (OUTPATIENT)
Dept: OTOLARYNGOLOGY | Facility: CLINIC | Age: 66
End: 2021-05-12
Payer: COMMERCIAL

## 2021-05-12 VITALS
BODY MASS INDEX: 31.07 KG/M2 | SYSTOLIC BLOOD PRESSURE: 114 MMHG | WEIGHT: 205 LBS | TEMPERATURE: 98 F | HEIGHT: 68 IN | DIASTOLIC BLOOD PRESSURE: 78 MMHG | HEART RATE: 87 BPM

## 2021-05-12 DIAGNOSIS — H93.13 BILATERAL TINNITUS: ICD-10-CM

## 2021-05-12 DIAGNOSIS — Z77.122 HISTORY OF EXPOSURE TO NOISE: ICD-10-CM

## 2021-05-12 DIAGNOSIS — R29.2 ABNORMAL ACOUSTIC REFLEX: ICD-10-CM

## 2021-05-12 DIAGNOSIS — Z82.2 FAMILY HISTORY OF HEARING LOSS: ICD-10-CM

## 2021-05-12 DIAGNOSIS — H90.3 ASYMMETRICAL SENSORINEURAL HEARING LOSS: Primary | ICD-10-CM

## 2021-05-12 DIAGNOSIS — H90.3 SENSORINEURAL HEARING LOSS, BILATERAL: ICD-10-CM

## 2021-05-12 PROCEDURE — 99213 OFFICE O/P EST LOW 20 MIN: CPT | Mod: S$GLB,,, | Performed by: OTOLARYNGOLOGY

## 2021-05-12 PROCEDURE — 92557 PR COMPREHENSIVE HEARING TEST: ICD-10-PCS | Mod: S$GLB,,, | Performed by: AUDIOLOGIST-HEARING AID FITTER

## 2021-05-12 PROCEDURE — 92550 PR TYMPANOMETRY AND REFLEX THRESHOLD MEASUREMENTS: ICD-10-PCS | Mod: S$GLB,,, | Performed by: AUDIOLOGIST-HEARING AID FITTER

## 2021-05-12 PROCEDURE — 3008F PR BODY MASS INDEX (BMI) DOCUMENTED: ICD-10-PCS | Mod: CPTII,S$GLB,, | Performed by: OTOLARYNGOLOGY

## 2021-05-12 PROCEDURE — 92550 TYMPANOMETRY & REFLEX THRESH: CPT | Mod: S$GLB,,, | Performed by: AUDIOLOGIST-HEARING AID FITTER

## 2021-05-12 PROCEDURE — 92557 COMPREHENSIVE HEARING TEST: CPT | Mod: S$GLB,,, | Performed by: AUDIOLOGIST-HEARING AID FITTER

## 2021-05-12 PROCEDURE — 3008F BODY MASS INDEX DOCD: CPT | Mod: CPTII,S$GLB,, | Performed by: OTOLARYNGOLOGY

## 2021-05-12 PROCEDURE — 99213 PR OFFICE/OUTPT VISIT, EST, LEVL III, 20-29 MIN: ICD-10-PCS | Mod: S$GLB,,, | Performed by: OTOLARYNGOLOGY

## 2021-05-12 RX ORDER — UBIDECARENONE 30 MG
30 CAPSULE ORAL 3 TIMES DAILY
COMMUNITY

## 2021-05-19 ENCOUNTER — PATIENT MESSAGE (OUTPATIENT)
Dept: INTERNAL MEDICINE | Facility: CLINIC | Age: 66
End: 2021-05-19

## 2021-05-20 ENCOUNTER — TELEPHONE (OUTPATIENT)
Dept: INTERNAL MEDICINE | Facility: CLINIC | Age: 66
End: 2021-05-20

## 2021-05-24 ENCOUNTER — TELEPHONE (OUTPATIENT)
Dept: INTERNAL MEDICINE | Facility: CLINIC | Age: 66
End: 2021-05-24

## 2021-10-04 ENCOUNTER — PATIENT MESSAGE (OUTPATIENT)
Dept: ADMINISTRATIVE | Facility: HOSPITAL | Age: 66
End: 2021-10-04

## 2021-10-15 ENCOUNTER — PATIENT MESSAGE (OUTPATIENT)
Dept: ADMINISTRATIVE | Facility: HOSPITAL | Age: 66
End: 2021-10-15
Payer: COMMERCIAL

## 2022-03-10 DIAGNOSIS — Z12.31 OTHER SCREENING MAMMOGRAM: ICD-10-CM

## 2022-03-16 DIAGNOSIS — Z12.11 COLON CANCER SCREENING: ICD-10-CM

## 2022-05-30 ENCOUNTER — PATIENT MESSAGE (OUTPATIENT)
Dept: ADMINISTRATIVE | Facility: HOSPITAL | Age: 67
End: 2022-05-30
Payer: COMMERCIAL

## 2022-06-29 ENCOUNTER — PATIENT OUTREACH (OUTPATIENT)
Dept: ADMINISTRATIVE | Facility: HOSPITAL | Age: 67
End: 2022-06-29
Payer: COMMERCIAL

## 2022-06-29 ENCOUNTER — PATIENT MESSAGE (OUTPATIENT)
Dept: ADMINISTRATIVE | Facility: HOSPITAL | Age: 67
End: 2022-06-29
Payer: COMMERCIAL

## 2022-08-24 ENCOUNTER — PATIENT MESSAGE (OUTPATIENT)
Dept: ADMINISTRATIVE | Facility: HOSPITAL | Age: 67
End: 2022-08-24
Payer: COMMERCIAL

## 2022-08-31 DIAGNOSIS — Z78.0 MENOPAUSE: ICD-10-CM

## 2022-09-07 DIAGNOSIS — Z78.0 ASYMPTOMATIC MENOPAUSAL STATE: ICD-10-CM
